# Patient Record
Sex: FEMALE | Race: WHITE | NOT HISPANIC OR LATINO | Employment: OTHER | URBAN - METROPOLITAN AREA
[De-identification: names, ages, dates, MRNs, and addresses within clinical notes are randomized per-mention and may not be internally consistent; named-entity substitution may affect disease eponyms.]

---

## 2017-09-06 ENCOUNTER — TRANSCRIBE ORDERS (OUTPATIENT)
Dept: ADMINISTRATIVE | Facility: HOSPITAL | Age: 62
End: 2017-09-06

## 2017-09-06 DIAGNOSIS — Z12.31 VISIT FOR SCREENING MAMMOGRAM: Primary | ICD-10-CM

## 2017-10-02 ENCOUNTER — HOSPITAL ENCOUNTER (OUTPATIENT)
Dept: RADIOLOGY | Facility: MEDICAL CENTER | Age: 62
Discharge: HOME | End: 2017-10-02
Payer: COMMERCIAL

## 2017-10-02 DIAGNOSIS — Z12.31 VISIT FOR SCREENING MAMMOGRAM: ICD-10-CM

## 2017-10-02 PROCEDURE — G0202 SCR MAMMO BI INCL CAD: HCPCS

## 2018-11-27 ENCOUNTER — TRANSCRIBE ORDERS (OUTPATIENT)
Dept: ADMINISTRATIVE | Facility: HOSPITAL | Age: 63
End: 2018-11-27

## 2018-11-27 DIAGNOSIS — M81.0 OSTEOPOROSIS, UNSPECIFIED OSTEOPOROSIS TYPE, UNSPECIFIED PATHOLOGICAL FRACTURE PRESENCE: Primary | ICD-10-CM

## 2018-11-27 DIAGNOSIS — Z12.31 VISIT FOR SCREENING MAMMOGRAM: Primary | ICD-10-CM

## 2018-12-03 ENCOUNTER — HOSPITAL ENCOUNTER (OUTPATIENT)
Dept: RADIOLOGY | Facility: MEDICAL CENTER | Age: 63
Discharge: HOME | End: 2018-12-03
Payer: COMMERCIAL

## 2018-12-03 VITALS — WEIGHT: 183 LBS | BODY MASS INDEX: 30.49 KG/M2 | HEIGHT: 65 IN

## 2018-12-03 DIAGNOSIS — Z12.31 VISIT FOR SCREENING MAMMOGRAM: ICD-10-CM

## 2018-12-03 DIAGNOSIS — M81.0 OSTEOPOROSIS, UNSPECIFIED OSTEOPOROSIS TYPE, UNSPECIFIED PATHOLOGICAL FRACTURE PRESENCE: ICD-10-CM

## 2018-12-03 PROCEDURE — 77080 DXA BONE DENSITY AXIAL: CPT

## 2018-12-03 PROCEDURE — 77067 SCR MAMMO BI INCL CAD: CPT

## 2019-05-19 ENCOUNTER — HOSPITAL ENCOUNTER (EMERGENCY)
Facility: HOSPITAL | Age: 64
Discharge: HOME | End: 2019-05-19
Attending: EMERGENCY MEDICINE | Admitting: EMERGENCY MEDICINE
Payer: COMMERCIAL

## 2019-05-19 ENCOUNTER — APPOINTMENT (EMERGENCY)
Dept: CT IMAGING | Facility: HOSPITAL | Age: 64
End: 2019-05-19
Payer: COMMERCIAL

## 2019-05-19 VITALS
WEIGHT: 199.08 LBS | BODY MASS INDEX: 31.99 KG/M2 | DIASTOLIC BLOOD PRESSURE: 81 MMHG | SYSTOLIC BLOOD PRESSURE: 181 MMHG | HEIGHT: 66 IN | TEMPERATURE: 97.5 F | RESPIRATION RATE: 17 BRPM | OXYGEN SATURATION: 93 % | HEART RATE: 64 BPM

## 2019-05-19 DIAGNOSIS — R42 VERTIGO: ICD-10-CM

## 2019-05-19 DIAGNOSIS — R42 DIZZINESS: Primary | ICD-10-CM

## 2019-05-19 LAB
ALBUMIN SERPL BCP-MCNC: 4 G/DL (ref 3.5–5)
ALP SERPL-CCNC: 88 U/L (ref 46–116)
ALT SERPL W P-5'-P-CCNC: 64 U/L (ref 12–78)
ANION GAP SERPL CALCULATED.3IONS-SCNC: 12 MMOL/L (ref 4–13)
AST SERPL W P-5'-P-CCNC: 49 U/L (ref 5–45)
ATRIAL RATE: 65 BPM
BASOPHILS # BLD AUTO: 0.05 THOUSANDS/ΜL (ref 0–0.1)
BASOPHILS NFR BLD AUTO: 1 % (ref 0–1)
BILIRUB SERPL-MCNC: 0.6 MG/DL (ref 0.2–1)
BUN SERPL-MCNC: 15 MG/DL (ref 5–25)
CALCIUM SERPL-MCNC: 9.5 MG/DL (ref 8.3–10.1)
CHLORIDE SERPL-SCNC: 103 MMOL/L (ref 100–108)
CO2 SERPL-SCNC: 23 MMOL/L (ref 21–32)
CREAT SERPL-MCNC: 0.93 MG/DL (ref 0.6–1.3)
EOSINOPHIL # BLD AUTO: 0.01 THOUSAND/ΜL (ref 0–0.61)
EOSINOPHIL NFR BLD AUTO: 0 % (ref 0–6)
ERYTHROCYTE [DISTWIDTH] IN BLOOD BY AUTOMATED COUNT: 12.7 % (ref 11.6–15.1)
GFR SERPL CREATININE-BSD FRML MDRD: 65 ML/MIN/1.73SQ M
GLUCOSE SERPL-MCNC: 190 MG/DL (ref 65–140)
HCT VFR BLD AUTO: 47.2 % (ref 34.8–46.1)
HGB BLD-MCNC: 15.7 G/DL (ref 11.5–15.4)
IMM GRANULOCYTES # BLD AUTO: 0.03 THOUSAND/UL (ref 0–0.2)
IMM GRANULOCYTES NFR BLD AUTO: 0 % (ref 0–2)
LYMPHOCYTES # BLD AUTO: 1.37 THOUSANDS/ΜL (ref 0.6–4.47)
LYMPHOCYTES NFR BLD AUTO: 16 % (ref 14–44)
MCH RBC QN AUTO: 31.8 PG (ref 26.8–34.3)
MCHC RBC AUTO-ENTMCNC: 33.3 G/DL (ref 31.4–37.4)
MCV RBC AUTO: 96 FL (ref 82–98)
MONOCYTES # BLD AUTO: 0.24 THOUSAND/ΜL (ref 0.17–1.22)
MONOCYTES NFR BLD AUTO: 3 % (ref 4–12)
NEUTROPHILS # BLD AUTO: 6.63 THOUSANDS/ΜL (ref 1.85–7.62)
NEUTS SEG NFR BLD AUTO: 80 % (ref 43–75)
NRBC BLD AUTO-RTO: 0 /100 WBCS
P AXIS: 31 DEGREES
PLATELET # BLD AUTO: 315 THOUSANDS/UL (ref 149–390)
PMV BLD AUTO: 11.2 FL (ref 8.9–12.7)
POTASSIUM SERPL-SCNC: 4.5 MMOL/L (ref 3.5–5.3)
PR INTERVAL: 176 MS
PROT SERPL-MCNC: 7.8 G/DL (ref 6.4–8.2)
QRS AXIS: 43 DEGREES
QRSD INTERVAL: 84 MS
QT INTERVAL: 412 MS
QTC INTERVAL: 422 MS
RBC # BLD AUTO: 4.93 MILLION/UL (ref 3.81–5.12)
SODIUM SERPL-SCNC: 138 MMOL/L (ref 136–145)
T WAVE AXIS: 21 DEGREES
VENTRICULAR RATE: 63 BPM
WBC # BLD AUTO: 8.33 THOUSAND/UL (ref 4.31–10.16)

## 2019-05-19 PROCEDURE — 80053 COMPREHEN METABOLIC PANEL: CPT | Performed by: EMERGENCY MEDICINE

## 2019-05-19 PROCEDURE — 93005 ELECTROCARDIOGRAM TRACING: CPT

## 2019-05-19 PROCEDURE — 96375 TX/PRO/DX INJ NEW DRUG ADDON: CPT

## 2019-05-19 PROCEDURE — 99284 EMERGENCY DEPT VISIT MOD MDM: CPT | Performed by: EMERGENCY MEDICINE

## 2019-05-19 PROCEDURE — 36415 COLL VENOUS BLD VENIPUNCTURE: CPT | Performed by: EMERGENCY MEDICINE

## 2019-05-19 PROCEDURE — 96361 HYDRATE IV INFUSION ADD-ON: CPT

## 2019-05-19 PROCEDURE — 96374 THER/PROPH/DIAG INJ IV PUSH: CPT

## 2019-05-19 PROCEDURE — 70450 CT HEAD/BRAIN W/O DYE: CPT

## 2019-05-19 PROCEDURE — 99285 EMERGENCY DEPT VISIT HI MDM: CPT

## 2019-05-19 PROCEDURE — 93010 ELECTROCARDIOGRAM REPORT: CPT | Performed by: INTERNAL MEDICINE

## 2019-05-19 PROCEDURE — 85025 COMPLETE CBC W/AUTO DIFF WBC: CPT | Performed by: EMERGENCY MEDICINE

## 2019-05-19 RX ORDER — ONDANSETRON 2 MG/ML
4 INJECTION INTRAMUSCULAR; INTRAVENOUS ONCE
Status: COMPLETED | OUTPATIENT
Start: 2019-05-19 | End: 2019-05-19

## 2019-05-19 RX ORDER — MECLIZINE HCL 12.5 MG/1
25 TABLET ORAL ONCE
Status: COMPLETED | OUTPATIENT
Start: 2019-05-19 | End: 2019-05-19

## 2019-05-19 RX ORDER — ONDANSETRON 4 MG/1
4 TABLET, ORALLY DISINTEGRATING ORAL EVERY 8 HOURS PRN
Qty: 30 TABLET | Refills: 0 | Status: SHIPPED | OUTPATIENT
Start: 2019-05-19 | End: 2021-04-30 | Stop reason: ALTCHOICE

## 2019-05-19 RX ORDER — MECLIZINE HYDROCHLORIDE 25 MG/1
25 TABLET ORAL 3 TIMES DAILY PRN
Qty: 30 TABLET | Refills: 0 | Status: SHIPPED | OUTPATIENT
Start: 2019-05-19 | End: 2021-04-30 | Stop reason: ALTCHOICE

## 2019-05-19 RX ORDER — DIAZEPAM 5 MG/ML
2.5 INJECTION, SOLUTION INTRAMUSCULAR; INTRAVENOUS ONCE
Status: COMPLETED | OUTPATIENT
Start: 2019-05-19 | End: 2019-05-19

## 2019-05-19 RX ADMIN — Medication 2.5 MG: at 10:53

## 2019-05-19 RX ADMIN — MECLIZINE 25 MG: 12.5 TABLET ORAL at 13:00

## 2019-05-19 RX ADMIN — SODIUM CHLORIDE 1000 ML: 0.9 INJECTION, SOLUTION INTRAVENOUS at 10:50

## 2019-05-19 RX ADMIN — ONDANSETRON 4 MG: 2 INJECTION INTRAMUSCULAR; INTRAVENOUS at 10:53

## 2019-05-21 ENCOUNTER — ANNUAL EXAM (OUTPATIENT)
Dept: OBGYN CLINIC | Facility: CLINIC | Age: 64
End: 2019-05-21
Payer: COMMERCIAL

## 2019-05-21 VITALS
SYSTOLIC BLOOD PRESSURE: 140 MMHG | HEIGHT: 65 IN | DIASTOLIC BLOOD PRESSURE: 86 MMHG | BODY MASS INDEX: 32.74 KG/M2 | WEIGHT: 196.5 LBS

## 2019-05-21 DIAGNOSIS — Z12.39 BREAST SCREENING, UNSPECIFIED: ICD-10-CM

## 2019-05-21 DIAGNOSIS — Z01.419 ENCOUNTER FOR WELL WOMAN EXAM: Primary | ICD-10-CM

## 2019-05-21 DIAGNOSIS — Z12.39 BREAST SCREENING: ICD-10-CM

## 2019-05-21 PROCEDURE — S0612 ANNUAL GYNECOLOGICAL EXAMINA: HCPCS | Performed by: PHYSICIAN ASSISTANT

## 2019-05-21 RX ORDER — DIPHENOXYLATE HYDROCHLORIDE AND ATROPINE SULFATE 2.5; .025 MG/1; MG/1
1 TABLET ORAL DAILY
COMMUNITY

## 2019-12-11 ENCOUNTER — HOSPITAL ENCOUNTER (OUTPATIENT)
Dept: RADIOLOGY | Facility: MEDICAL CENTER | Age: 64
Discharge: HOME | End: 2019-12-11
Payer: MEDICARE

## 2019-12-11 VITALS — WEIGHT: 196 LBS | HEIGHT: 65 IN | BODY MASS INDEX: 32.65 KG/M2

## 2019-12-11 DIAGNOSIS — Z12.39 BREAST SCREENING: ICD-10-CM

## 2019-12-11 PROCEDURE — 77067 SCR MAMMO BI INCL CAD: CPT

## 2020-06-11 ENCOUNTER — ANNUAL EXAM (OUTPATIENT)
Dept: OBGYN CLINIC | Facility: CLINIC | Age: 65
End: 2020-06-11
Payer: MEDICARE

## 2020-06-11 VITALS
HEIGHT: 65 IN | WEIGHT: 195 LBS | SYSTOLIC BLOOD PRESSURE: 130 MMHG | BODY MASS INDEX: 32.49 KG/M2 | DIASTOLIC BLOOD PRESSURE: 82 MMHG

## 2020-06-11 DIAGNOSIS — Z78.0 POSTMENOPAUSAL: ICD-10-CM

## 2020-06-11 DIAGNOSIS — N95.2 VAGINAL ATROPHY: ICD-10-CM

## 2020-06-11 DIAGNOSIS — Z12.31 BREAST CANCER SCREENING BY MAMMOGRAM: ICD-10-CM

## 2020-06-11 DIAGNOSIS — B37.2 YEAST DERMATITIS: ICD-10-CM

## 2020-06-11 DIAGNOSIS — Z01.419 ENCOUNTER FOR WELL WOMAN EXAM: Primary | ICD-10-CM

## 2020-06-11 DIAGNOSIS — Z12.39 ENCOUNTER FOR SCREENING BREAST EXAMINATION: ICD-10-CM

## 2020-06-11 PROCEDURE — G0101 CA SCREEN;PELVIC/BREAST EXAM: HCPCS | Performed by: PHYSICIAN ASSISTANT

## 2020-06-11 RX ORDER — CLOTRIMAZOLE AND BETAMETHASONE DIPROPIONATE 10; .64 MG/G; MG/G
CREAM TOPICAL 2 TIMES DAILY
Qty: 30 G | Refills: 0 | Status: SHIPPED | OUTPATIENT
Start: 2020-06-11 | End: 2021-09-17 | Stop reason: ALTCHOICE

## 2020-12-02 ENCOUNTER — TRANSCRIBE ORDERS (OUTPATIENT)
Dept: ADMINISTRATIVE | Facility: HOSPITAL | Age: 65
End: 2020-12-02

## 2020-12-02 DIAGNOSIS — Z12.31 ENCOUNTER FOR SCREENING MAMMOGRAM FOR MALIGNANT NEOPLASM OF BREAST: Primary | ICD-10-CM

## 2021-02-13 DIAGNOSIS — Z23 ENCOUNTER FOR IMMUNIZATION: ICD-10-CM

## 2021-02-17 ENCOUNTER — IMMUNIZATIONS (OUTPATIENT)
Dept: FAMILY MEDICINE CLINIC | Facility: HOSPITAL | Age: 66
End: 2021-02-17

## 2021-02-17 DIAGNOSIS — Z23 ENCOUNTER FOR IMMUNIZATION: Primary | ICD-10-CM

## 2021-02-17 PROCEDURE — 91300 SARS-COV-2 / COVID-19 MRNA VACCINE (PFIZER-BIONTECH) 30 MCG: CPT

## 2021-02-17 PROCEDURE — 0001A SARS-COV-2 / COVID-19 MRNA VACCINE (PFIZER-BIONTECH) 30 MCG: CPT

## 2021-03-08 ENCOUNTER — IMMUNIZATIONS (OUTPATIENT)
Dept: FAMILY MEDICINE CLINIC | Facility: HOSPITAL | Age: 66
End: 2021-03-08

## 2021-03-08 DIAGNOSIS — Z23 ENCOUNTER FOR IMMUNIZATION: Primary | ICD-10-CM

## 2021-03-08 PROCEDURE — 0002A SARS-COV-2 / COVID-19 MRNA VACCINE (PFIZER-BIONTECH) 30 MCG: CPT

## 2021-03-08 PROCEDURE — 91300 SARS-COV-2 / COVID-19 MRNA VACCINE (PFIZER-BIONTECH) 30 MCG: CPT

## 2021-04-07 ENCOUNTER — HOSPITAL ENCOUNTER (OUTPATIENT)
Dept: RADIOLOGY | Facility: MEDICAL CENTER | Age: 66
Discharge: HOME | End: 2021-04-07
Payer: MEDICARE

## 2021-04-07 VITALS — HEIGHT: 65 IN | WEIGHT: 195 LBS | BODY MASS INDEX: 32.49 KG/M2

## 2021-04-07 DIAGNOSIS — Z12.31 ENCOUNTER FOR SCREENING MAMMOGRAM FOR MALIGNANT NEOPLASM OF BREAST: ICD-10-CM

## 2021-04-07 PROCEDURE — 77067 SCR MAMMO BI INCL CAD: CPT

## 2021-04-07 PROCEDURE — 77063 BREAST TOMOSYNTHESIS BI: CPT

## 2021-04-29 NOTE — PROGRESS NOTES
FAMILY MEDICINE NEW PATIENT     Date of Service: 21  Primary Care Provider:   Ellen Galvan MD     Name: Lucía Knutson       : 1955       Age:65 y o  Sex: female      MRN: [id number]      Chief Complaint:New Patient Visit       ASSESSMENT and PLAN:  Lucía Knutson is a 76 y o  female here to establish care with:     Problem List Items Addressed This Visit        Other    Elevated blood pressure reading without diagnosis of hypertension - Primary     BP Readings from Last 3 Encounters:   21 140/80   20 130/82   19 140/86     She has had increasingly elevated blood pressures in recent years, she has noticed this when going to give blood  No symptoms of hypertension  Will have patient follow-up in 6 months for AWV and to recheck blood pressure  Other Visit Diagnoses     Screening for hyperlipidemia        Relevant Orders    Lipid Panel with Direct LDL reflex    Screening for diabetes mellitus        Relevant Orders    Comprehensive metabolic panel    Need for hepatitis C screening test        Relevant Orders    Hepatitis C Antibody ([facility])           SUBJECTIVE:  Sailaja Miller is a 76 y o  female who presents today with a chief complaint of New Patient Visit  HPI     She has not been seeing a doctor recently  She is caring for her mother who is 80 and she realized that she should be seeing doctors  She is healthy, does not have any particular concerns today  She is up to date on routine screenings with mammogram, she had colonoscopy a few years ago  She has noticed her blood pressure creep up over the years when she donates blood  She does walk 3 to 4 miles a day  She works as a seamstress doing alternations for a   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 [place]  She has 4 children and 5 grandchildren  She has been  for 45 years      Review of Systems   Constitutional: Negative for activity change, appetite change, chills, fatigue and fever    HENT: Negative for congestion, rhinorrhea and sore throat  Eyes: Negative for visual disturbance  Respiratory: Negative for cough, chest tightness and shortness of breath  Cardiovascular: Negative for chest pain, palpitations and leg swelling  Gastrointestinal: Negative for constipation, diarrhea, nausea and vomiting  Endocrine: Negative for cold intolerance and heat intolerance  Genitourinary: Negative for dysuria and frequency  Musculoskeletal: Negative for arthralgias, back pain and neck pain  Skin: Negative for rash  Neurological: Negative for dizziness, syncope, light-headedness and headaches  Psychiatric/Behavioral: Negative for dysphoric mood and sleep disturbance  The patient is not nervous/anxious  I have reviewed the patient's PMH, Surgical History, Family History, Social History, Medication List and Allergies        Histories Reviewed and Updated 4/30/2021:  Patient's Medications   New Prescriptions    No medications on file   Previous Medications    CLOTRIMAZOLE-BETAMETHASONE (LOTRISONE) 1-0 05 % CREAM    Apply topically 2 (two) times a day    MULTIVITAMIN (THERAGRAN) TABS    Take 1 tablet by mouth daily   Modified Medications    No medications on file   Discontinued Medications    MECLIZINE (ANTIVERT) 25 MG TABLET    Take 1 tablet (25 mg total) by mouth 3 (three) times a day as needed for dizziness    ONDANSETRON (ZOFRAN-ODT) 4 MG DISINTEGRATING TABLET    Take 1 tablet (4 mg total) by mouth every 8 (eight) hours as needed for nausea or vomiting     No Known Allergies  Past Medical History:   Diagnosis Date    Arthritis     Vertigo 05/2019     Past Surgical History:   Procedure Laterality Date    CHOLECYSTECTOMY      HYSTERECTOMY Bilateral     age 42     Social History     Socioeconomic History    Marital status: /Civil Union     Spouse name: Not on file    Number of children: Not on file    Years of education: Not on file    Highest education level: Not on file   Occupational History    Not on file   Social Needs    Financial resource strain: Not on file    Food insecurity     Worry: Not on file     Inability: Not on file    Transportation needs     Medical: Not on file     Non-medical: Not on file   Tobacco Use    Smoking status: Never Smoker    Smokeless tobacco: Never Used   Substance and Sexual Activity    Alcohol use:  Yes     Alcohol/week: 1 0 standard drinks     Types: 1 Glasses of wine per week     Frequency: Monthly or less     Drinks per session: 1 or 2     Comment: very rarely    Drug use: Never    Sexual activity: Yes     Partners: Male     Birth control/protection: Female Sterilization     Comment: Hysterectomy   Lifestyle    Physical activity     Days per week: Not on file     Minutes per session: Not on file    Stress: Not on file   Relationships    Social connections     Talks on phone: Not on file     Gets together: Not on file     Attends Gnosticism service: Not on file     Active member of club or organization: Not on file     Attends meetings of clubs or organizations: Not on file     Relationship status: Not on file    Intimate partner violence     Fear of current or ex partner: Not on file     Emotionally abused: Not on file     Physically abused: Not on file     Forced sexual activity: Not on file   Other Topics Concern    Not on file   Social History Narrative    Not on file     Family History   Problem Relation Age of Onset    Prostate cancer Father 58    Stroke Father     Hypertension Father     Hyperlipidemia Father     Stomach cancer Maternal Grandfather 61    Stomach cancer Paternal Grandmother [de-identified]    Cancer Paternal Grandfather 48    Colon cancer Maternal Aunt 48    Vision loss Mother     Atrial fibrillation Mother     Glaucoma Mother     Macular degeneration Mother     Dementia Mother     Hypertension Mother     Hyperlipidemia Mother     No Known Problems Sister     No Known Problems Daughter     No Known Problems Maternal Grandmother     No Known Problems Sister     No Known Problems Son     No Known Problems Daughter     No Known Problems Daughter     No Known Problems Paternal Aunt      Immunization History   Administered Date(s) Administered    Influenza, high dose seasonal 0 7 mL 09/15/2020    SARS-CoV-2 / COVID-19 mRNA IM (Pf[employer]) 02/17/2021, 03/08/2021     OBJECTIVE:  /80   Pulse 66   Temp (!) 97 1 °F (36 2 °C)   Resp 16   Ht 5' 5" (1 651 m)   Wt 88 9 kg (196 lb)   SpO2 99%   BMI 32 62 kg/m²   BP Readings from Last 3 Encounters:   04/30/21 140/80   06/11/20 130/82   05/21/19 140/86      Wt Readings from Last 3 Encounters:   04/30/21 88 9 kg (196 lb)   04/07/21 88 5 kg (195 lb)   06/11/20 88 5 kg (195 lb)      Physical Exam  Constitutional:       General: She is not in acute distress  Appearance: Normal appearance  She is normal weight  She is not ill-appearing or diaphoretic  HENT:      Head: Normocephalic and atraumatic  Right Ear: External ear normal       Left Ear: External ear normal       Nose: Nose normal       Mouth/Throat:      Mouth: Mucous membranes are moist    Eyes:      Extraocular Movements: Extraocular movements intact  Conjunctiva/sclera: Conjunctivae normal    Neck:      Musculoskeletal: Normal range of motion and neck supple  Pulmonary:      Effort: Pulmonary effort is normal  No respiratory distress  Abdominal:      General: There is no distension  Musculoskeletal: Normal range of motion  Skin:     Findings: No erythema or rash  Neurological:      General: No focal deficit present  Mental Status: She is alert  Gait: Gait normal    Psychiatric:         Mood and Affect: Mood normal          Behavior: Behavior normal          BMI Counseling: Body mass index is 32 62 kg/m²   The BMI is above normal  Nutrition recommendations include encouraging healthy choices of fruits and vegetables, consuming healthier snacks and reducing intake of saturated and trans fat  Exercise recommendations include moderate physical activity 150 minutes/week and strength training exercises             Rosamaria Almanzar MD     Follow-up in 6 months to recheck BP  Due for PVC 13

## 2021-04-30 ENCOUNTER — OFFICE VISIT (OUTPATIENT)
Dept: FAMILY MEDICINE CLINIC | Facility: CLINIC | Age: 66
End: 2021-04-30
Payer: MEDICARE

## 2021-04-30 VITALS
OXYGEN SATURATION: 99 % | BODY MASS INDEX: 32.65 KG/M2 | TEMPERATURE: 97.1 F | WEIGHT: 196 LBS | RESPIRATION RATE: 16 BRPM | HEART RATE: 66 BPM | HEIGHT: 65 IN | DIASTOLIC BLOOD PRESSURE: 80 MMHG | SYSTOLIC BLOOD PRESSURE: 140 MMHG

## 2021-04-30 DIAGNOSIS — Z11.59 NEED FOR HEPATITIS C SCREENING TEST: ICD-10-CM

## 2021-04-30 DIAGNOSIS — Z13.1 SCREENING FOR DIABETES MELLITUS: ICD-10-CM

## 2021-04-30 DIAGNOSIS — R03.0 ELEVATED BLOOD PRESSURE READING WITHOUT DIAGNOSIS OF HYPERTENSION: Primary | ICD-10-CM

## 2021-04-30 DIAGNOSIS — Z13.220 SCREENING FOR HYPERLIPIDEMIA: ICD-10-CM

## 2021-04-30 PROCEDURE — 99204 OFFICE O/P NEW MOD 45 MIN: CPT | Performed by: FAMILY MEDICINE

## 2021-04-30 NOTE — ASSESSMENT & PLAN NOTE
BP Readings from Last 3 Encounters:   04/30/21 140/80   06/11/20 130/82   05/21/19 140/86     She has had increasingly elevated blood pressures in recent years, she has noticed this when going to give blood  No symptoms of hypertension  Will have patient follow-up in 6 months for AWV and to recheck blood pressure

## 2021-05-03 ENCOUNTER — TELEPHONE (OUTPATIENT)
Dept: FAMILY MEDICINE CLINIC | Facility: CLINIC | Age: 66
End: 2021-05-03

## 2021-05-03 NOTE — TELEPHONE ENCOUNTER
Due for PVC13  TDAP? Was not done at visit on 4/30/2021  Dr Diego Chavarria would like patient to schedule nurse visit  Left message for patient to call back office

## 2021-05-18 ENCOUNTER — APPOINTMENT (OUTPATIENT)
Dept: LAB | Facility: CLINIC | Age: 66
End: 2021-05-18
Payer: MEDICARE

## 2021-05-18 DIAGNOSIS — Z13.220 SCREENING FOR HYPERLIPIDEMIA: ICD-10-CM

## 2021-05-18 DIAGNOSIS — Z11.59 NEED FOR HEPATITIS C SCREENING TEST: ICD-10-CM

## 2021-05-18 DIAGNOSIS — Z13.1 SCREENING FOR DIABETES MELLITUS: ICD-10-CM

## 2021-05-18 LAB
ALBUMIN SERPL BCP-MCNC: 3.9 G/DL (ref 3.5–5)
ALP SERPL-CCNC: 69 U/L (ref 46–116)
ALT SERPL W P-5'-P-CCNC: 30 U/L (ref 12–78)
ANION GAP SERPL CALCULATED.3IONS-SCNC: 7 MMOL/L (ref 4–13)
AST SERPL W P-5'-P-CCNC: 16 U/L (ref 5–45)
BILIRUB SERPL-MCNC: 0.89 MG/DL (ref 0.2–1)
BUN SERPL-MCNC: 13 MG/DL (ref 5–25)
CALCIUM SERPL-MCNC: 9.9 MG/DL (ref 8.3–10.1)
CHLORIDE SERPL-SCNC: 110 MMOL/L (ref 100–108)
CHOLEST SERPL-MCNC: 216 MG/DL (ref 50–200)
CO2 SERPL-SCNC: 26 MMOL/L (ref 21–32)
CREAT SERPL-MCNC: 0.97 MG/DL (ref 0.6–1.3)
GFR SERPL CREATININE-BSD FRML MDRD: 61 ML/MIN/1.73SQ M
GLUCOSE P FAST SERPL-MCNC: 109 MG/DL (ref 65–99)
HCV AB SER QL: NORMAL
HDLC SERPL-MCNC: 49 MG/DL
LDLC SERPL CALC-MCNC: 141 MG/DL (ref 0–100)
POTASSIUM SERPL-SCNC: 4.2 MMOL/L (ref 3.5–5.3)
PROT SERPL-MCNC: 7.4 G/DL (ref 6.4–8.2)
SODIUM SERPL-SCNC: 143 MMOL/L (ref 136–145)
TRIGL SERPL-MCNC: 132 MG/DL

## 2021-05-18 PROCEDURE — 80061 LIPID PANEL: CPT

## 2021-05-18 PROCEDURE — 86803 HEPATITIS C AB TEST: CPT

## 2021-05-18 PROCEDURE — 80053 COMPREHEN METABOLIC PANEL: CPT

## 2021-05-18 PROCEDURE — 36415 COLL VENOUS BLD VENIPUNCTURE: CPT

## 2021-06-14 ENCOUNTER — OFFICE VISIT (OUTPATIENT)
Dept: OBGYN CLINIC | Facility: CLINIC | Age: 66
End: 2021-06-14
Payer: MEDICARE

## 2021-06-14 VITALS
DIASTOLIC BLOOD PRESSURE: 80 MMHG | SYSTOLIC BLOOD PRESSURE: 134 MMHG | BODY MASS INDEX: 32.15 KG/M2 | WEIGHT: 193 LBS | HEIGHT: 65 IN

## 2021-06-14 DIAGNOSIS — N95.2 VAGINAL ATROPHY: ICD-10-CM

## 2021-06-14 DIAGNOSIS — Z12.11 ENCOUNTER FOR SCREENING COLONOSCOPY: ICD-10-CM

## 2021-06-14 DIAGNOSIS — Z12.31 ENCOUNTER FOR SCREENING MAMMOGRAM FOR BREAST CANCER: ICD-10-CM

## 2021-06-14 DIAGNOSIS — Z78.0 POSTMENOPAUSAL: Primary | ICD-10-CM

## 2021-06-14 PROCEDURE — 99213 OFFICE O/P EST LOW 20 MIN: CPT | Performed by: PHYSICIAN ASSISTANT

## 2021-06-14 NOTE — PROGRESS NOTES
Assessment/Plan:    No problem-specific Assessment & Plan notes found for this encounter  Diagnoses and all orders for this visit:    Postmenopausal    Vaginal atrophy    Encounter for screening mammogram for breast cancer  -     Mammo screening bilateral w 3d & cad; Future    Encounter for screening colonoscopy  -     Ambulatory referral for colon cancer education; Future          Subjective:      Patient ID: Sanju Montemayor is a 76 y o  female  Pt presents for follow up atrophy today--  She has no complaints  She has no bleeding or pelvic pain--hyster  Bowel and bladder are regular  Colonoscopy--never  No breast concerns today  Last mammo--due    No pap today  rx mammo  rx colonsocopy      The following portions of the patient's history were reviewed and updated as appropriate: allergies, current medications, past family history, past medical history, past social history, past surgical history and problem list     Review of Systems   Constitutional: Negative for chills, fever and unexpected weight change  Gastrointestinal: Negative for abdominal pain, blood in stool, constipation and diarrhea  Genitourinary: Negative  Objective:      /80   Ht 5' 5" (1 651 m)   Wt 87 5 kg (193 lb)   BMI 32 12 kg/m²          Physical Exam  Vitals and nursing note reviewed  Constitutional:       Appearance: She is well-developed  HENT:      Head: Normocephalic and atraumatic  Chest:      Breasts:         Right: No inverted nipple, mass, nipple discharge or skin change  Left: No inverted nipple, mass, nipple discharge or skin change  Abdominal:      Palpations: Abdomen is soft  Genitourinary:     Exam position: Supine  Labia:         Right: No rash, tenderness or lesion  Left: No rash, tenderness or lesion  Vagina: Normal       Cervix: No cervical motion tenderness, discharge or friability  Uterus: Absent         Adnexa:         Right: No mass, tenderness or fullness  Left: No mass, tenderness or fullness  Comments: Atrophy noted  Musculoskeletal:      Cervical back: Normal range of motion  Lymphadenopathy:      Lower Body: No right inguinal adenopathy  No left inguinal adenopathy

## 2021-06-14 NOTE — PROGRESS NOTES
Patient is here for yearly exam      Patient is not due for a pap smear due to having a hysterectomy in 1996  HYSTER (1996)     12/11/19 Normal Mammo

## 2021-09-17 ENCOUNTER — OFFICE VISIT (OUTPATIENT)
Dept: FAMILY MEDICINE CLINIC | Facility: CLINIC | Age: 66
End: 2021-09-17
Payer: MEDICARE

## 2021-09-17 VITALS
HEART RATE: 80 BPM | WEIGHT: 197 LBS | RESPIRATION RATE: 16 BRPM | SYSTOLIC BLOOD PRESSURE: 124 MMHG | TEMPERATURE: 97.5 F | BODY MASS INDEX: 32.82 KG/M2 | DIASTOLIC BLOOD PRESSURE: 72 MMHG | HEIGHT: 65 IN

## 2021-09-17 DIAGNOSIS — M75.02 ADHESIVE CAPSULITIS OF LEFT SHOULDER: Primary | ICD-10-CM

## 2021-09-17 PROCEDURE — 99213 OFFICE O/P EST LOW 20 MIN: CPT | Performed by: FAMILY MEDICINE

## 2021-09-17 NOTE — PATIENT INSTRUCTIONS
Adhesive Capsulitis   AMBULATORY CARE:   Adhesive capsulitis  happens when tissues in your shoulder tighten and swell  The condition is often called frozen shoulder because the swollen tissues cause pain and decrease your shoulder movement  Common signs and symptoms:   · Shoulder pain and stiffness that gets worse over time    · Pain at night that wakes you    · An ache in your shoulder even at rest but that gets worse with movement    · Muscle spasms in your neck, shoulder joint, or near your collarbone    · Trouble reaching over your head or behind you    · Muscle weakness    Seek care immediately if:   · You have new or increased trouble moving your arm  Contact your healthcare provider if:   · You have worse pain and stiffness in your shoulder  · You have questions or concerns about your condition  Treatment:  The goal of treatment is to help you regain as much shoulder movement as possible  Treatment will depend on what stage you are in  Ask your healthcare provider about these and other treatments for adhesive capsulitis:  · Prescription pain medicine  may be given  Ask your healthcare provider how to take this medicine safely  Some prescription pain medicines contain acetaminophen  Do not take other medicines that contain acetaminophen without talking to your healthcare provider  Too much acetaminophen may cause liver damage  Prescription pain medicine may cause constipation  Ask your healthcare provider how to prevent or treat constipation  · NSAIDs  help decrease swelling and pain or fever  This medicine is available with or without a doctor's order  NSAIDs can cause stomach bleeding or kidney problems in certain people  If you take blood thinner medicine, always ask your healthcare provider if NSAIDs are safe for you  Always read the medicine label and follow directions  · Steroid medicine  helps decrease pain and swelling   Healthcare providers may give this medicine as a shot into your shoulder  · Physical therapy:  A physical therapist teaches you exercises to help improve movement and strength, and to decrease pain  · Manipulation  is a procedure used to move your shoulder  You will be given anesthesia to make you sleep through this procedure  Manipulation releases tightness in your shoulder and improves movement  · Surgery  may be used to cut the tissues in your shoulder to release the tightness  During surgery, swollen or damaged tissue may also be removed  Surgery may be needed if other treatments do not help  Manage adhesive capsulitis:   · Stretches:      ? Doorway stretch:   a doorway with your painful arm bent at the elbow  Place your hand on the door frame and turn your body away from the door frame  Hold this position for 30 seconds  Relax and repeat  ? Forward stretch:  Lie on your back with your legs straight out  Use your healthy arm to push your painful arm up over your head until you feel a gentle stretch  Hold this position for 15 seconds  Slowly lower your arm to the starting position  Relax and repeat  ? Crossover stretch:  Use your healthy arm to gently pull your painful arm across your chest just below your chin  Pull until you feel a gentle stretch  Hold this position for 30 seconds  Relax and repeat  · Apply ice as directed  Ice helps decrease pain and swelling  Apply ice to help ease pain after stretching  Use an ice pack, or put crushed ice in a plastic bag  Cover it with a towel before you apply it to your shoulder  Apply ice for 15 to 20 minutes every hour, or as directed  · Apply heat as directed  Heat helps relax muscles and may help improve shoulder movement  Use a heat pack, or soak a small towel in warm water  Wring out the extra water before you apply the towel to your shoulder  Apply heat for 20 to 30 minutes every hour, or as directed      Follow up with your healthcare provider as directed:  Write down your questions so you remember to ask them during your visits  © Copyright [employer] 2021 Information is for End User's use only and may not be sold, redistributed or otherwise used for commercial purposes  All illustrations and images included in CareNotes® are the copyrighted property of A D A M , Inc  or Dario Barakat  The above information is an  only  It is not intended as medical advice for individual conditions or treatments  Talk to your doctor, nurse or pharmacist before following any medical regimen to see if it is safe and effective for you

## 2021-09-17 NOTE — PROGRESS NOTES
FAMILY MEDICINE PROGRESS NOTE    Date of Service: 21  Primary Care Provider:   Brendon Gdooy MD       Name: Debi Hamilton       : 1955       Age:66 y o  Sex: female      MRN: [id number]      Chief Complaint:Shoulder Pain (left)       ASSESSMENT and PLAN:  Debi Hamilton is a 78 y o  female with:     Problem List Items Addressed This Visit     None      Visit Diagnoses     Adhesive capsulitis of left shoulder    -  Primary    Relevant Orders    Ambulatory referral to Physical Therapy        Patient with signs and symptoms of adhesive capsulitis  Will refer to PT for eval and treatment  Counseled on importance of therapy to restore range of motion and function  SUBJECTIVE:  Debi Hamilton is a 78 y o  female who presents today with a chief complaint of Shoulder Pain (left)  HPI     She reports shoulder pain and decreased range of motion  She had has some pain and decreased motion for a period of time that worsened Tuesday 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  She has difficulty pulling shirt over her head, washing hair, putting on seatbelt  It is impacting her ADLs, interrupting sleep  She has been taking ibuprofen which helps some  Review of Systems   Musculoskeletal: Positive for arthralgias  Negative for neck pain  Neurological: Positive for weakness (in left shoulder )  Negative for numbness  I have reviewed the patient's Past Medical History      Current Outpatient Medications:     multivitamin (THERAGRAN) TABS, Take 1 tablet by mouth daily, Disp: , Rfl:     OBJECTIVE:  /72   Pulse 80   Temp 97 5 °F (36 4 °C)   Resp 16   Ht 5' 5" (1 651 m)   Wt 89 4 kg (197 lb)   BMI 32 78 kg/m²    BP Readings from Last 3 Encounters:   21 124/72   21 134/80   21 140/80      Wt Readings from Last 3 Encounters:   21 89 4 kg (197 lb)   21 87 5 kg (193 lb)   21 88 9 kg (196 lb)      Physical Exam  Constitutional:       General: She is not in acute distress  Appearance: Normal appearance  She is not toxic-appearing  Musculoskeletal:      Right shoulder: Normal  No swelling or deformity  Normal range of motion  Normal strength  Left shoulder: Tenderness present  No bony tenderness  Decreased range of motion  Decreased strength (secondary to decreased range of motion)  Cervical back: Normal range of motion  No rigidity  Comments: Unable to forward flex, abduct past 90 degrees  Unable to perform Hawking's an Neer's due to limited range of motion  Negative Yergason's test   Neurological:      Mental Status: She is alert  Return if symptoms worsen or fail to improve, for due for ARSALAN Betancur MD    Note: Portions of the record have been created with voice recognition software  Occasional wrong word or "sound a like" substitutions may have occurred due to the inherent limitations of voice recognition software  Read the chart carefully and recognize, using context, where substitutions have occurred

## 2021-10-25 ENCOUNTER — RA CDI HCC (OUTPATIENT)
Dept: OTHER | Facility: HOSPITAL | Age: 66
End: 2021-10-25

## 2021-10-31 PROBLEM — E66.09 CLASS 1 OBESITY DUE TO EXCESS CALORIES WITHOUT SERIOUS COMORBIDITY WITH BODY MASS INDEX (BMI) OF 32.0 TO 32.9 IN ADULT: Status: ACTIVE | Noted: 2021-10-31

## 2021-10-31 PROBLEM — E78.5 DYSLIPIDEMIA: Status: ACTIVE | Noted: 2021-10-31

## 2021-10-31 PROBLEM — R73.01 IMPAIRED FASTING GLUCOSE: Status: ACTIVE | Noted: 2021-10-31

## 2021-10-31 PROBLEM — E66.811 CLASS 1 OBESITY DUE TO EXCESS CALORIES WITHOUT SERIOUS COMORBIDITY WITH BODY MASS INDEX (BMI) OF 32.0 TO 32.9 IN ADULT: Status: ACTIVE | Noted: 2021-10-31

## 2021-11-01 ENCOUNTER — OFFICE VISIT (OUTPATIENT)
Dept: FAMILY MEDICINE CLINIC | Facility: CLINIC | Age: 66
End: 2021-11-01

## 2021-11-01 VITALS
HEART RATE: 62 BPM | WEIGHT: 199 LBS | DIASTOLIC BLOOD PRESSURE: 78 MMHG | RESPIRATION RATE: 18 BRPM | OXYGEN SATURATION: 97 % | SYSTOLIC BLOOD PRESSURE: 134 MMHG | BODY MASS INDEX: 33.15 KG/M2 | TEMPERATURE: 96.8 F | HEIGHT: 65 IN

## 2021-11-01 DIAGNOSIS — R03.0 ELEVATED BLOOD PRESSURE READING WITHOUT DIAGNOSIS OF HYPERTENSION: ICD-10-CM

## 2021-11-01 DIAGNOSIS — E66.09 CLASS 1 OBESITY DUE TO EXCESS CALORIES WITHOUT SERIOUS COMORBIDITY WITH BODY MASS INDEX (BMI) OF 33.0 TO 33.9 IN ADULT: ICD-10-CM

## 2021-11-01 DIAGNOSIS — E78.5 DYSLIPIDEMIA: ICD-10-CM

## 2021-11-01 DIAGNOSIS — Z12.12 SCREENING FOR COLORECTAL CANCER: ICD-10-CM

## 2021-11-01 DIAGNOSIS — Z23 ENCOUNTER FOR IMMUNIZATION: ICD-10-CM

## 2021-11-01 DIAGNOSIS — R73.01 IMPAIRED FASTING GLUCOSE: ICD-10-CM

## 2021-11-01 DIAGNOSIS — Z00.00 MEDICARE ANNUAL WELLNESS VISIT, INITIAL: Primary | ICD-10-CM

## 2021-11-01 DIAGNOSIS — Z12.11 SCREENING FOR COLORECTAL CANCER: ICD-10-CM

## 2021-11-01 PROCEDURE — G0008 ADMIN INFLUENZA VIRUS VAC: HCPCS

## 2021-11-01 PROCEDURE — 1123F ACP DISCUSS/DSCN MKR DOCD: CPT

## 2021-11-01 PROCEDURE — 90662 IIV NO PRSV INCREASED AG IM: CPT

## 2021-11-01 PROCEDURE — G0438 PPPS, INITIAL VISIT: HCPCS | Performed by: FAMILY MEDICINE

## 2022-02-01 ENCOUNTER — TELEPHONE (OUTPATIENT)
Dept: GASTROENTEROLOGY | Facility: CLINIC | Age: 67
End: 2022-02-01

## 2022-02-01 ENCOUNTER — PREP FOR PROCEDURE (OUTPATIENT)
Dept: GASTROENTEROLOGY | Facility: CLINIC | Age: 67
End: 2022-02-01

## 2022-02-01 DIAGNOSIS — Z12.11 COLON CANCER SCREENING: Primary | ICD-10-CM

## 2022-02-01 NOTE — TELEPHONE ENCOUNTER
Scheduled date of colonoscopy (as of today): 5/4/2022  Physician performing colonoscopy:Dr Ma  Location of colonoscopy:[facility]  Bowel prep reviewed with patient: Miralax/Mag Citrate  Instructions reviewed with patient by: Nora/kaitlyn  Clearances: N/A        02/01/22  Screened by: Trista Menezes MA     Referring Provider Dr Sewell     Pre- Screening:      Body mass index is 33 63 kg/m²  Has patient been referred for a routine screening Colonoscopy? yes  Is the patient between 39-70 years old? yes        Previous Colonoscopy yes   If yes:    Date: about 10 years ago    Facility    Reason:         SCHEDULING STAFF: If the patient is between 45yrs-49yrs, please advise patient to confirm benefits/coverage with their insurance company for a routine screening colonoscopy, some insurance carriers will only cover at [facility] or [facility]  If the patient is over 66years old, please schedule an office visit       Does the patient want to see a Gastroenterologist prior to their procedure OR are they having any GI symptoms? no     Has the patient been hospitalized or had abdominal surgery in the past 6 months? no     Does the patient use supplemental oxygen? no     Does the patient take Coumadin, Lovenox, Plavix, Elliquis, Xarelto, or other blood thinning medication? no     Has the patient had a stroke, cardiac event, or stent placed in the past year? no     SCHEDULING STAFF: If patient answers NO to above questions, then schedule procedure   If patient answers YES to above questions, then schedule office appointment       If patient is between 45yrs - 49yrs, please advise patient that we will have to confirm benefits & coverage with their insurance company for a routine screening colonoscopy

## 2022-04-14 ENCOUNTER — HOSPITAL ENCOUNTER (OUTPATIENT)
Dept: RADIOLOGY | Facility: MEDICAL CENTER | Age: 67
Discharge: HOME | End: 2022-04-14
Payer: MEDICARE

## 2022-04-14 VITALS — WEIGHT: 199 LBS | HEIGHT: 65 IN | BODY MASS INDEX: 33.15 KG/M2

## 2022-04-14 DIAGNOSIS — Z12.31 ENCOUNTER FOR SCREENING MAMMOGRAM FOR BREAST CANCER: ICD-10-CM

## 2022-04-14 PROCEDURE — 77067 SCR MAMMO BI INCL CAD: CPT

## 2022-04-14 PROCEDURE — 77063 BREAST TOMOSYNTHESIS BI: CPT

## 2022-04-28 NOTE — TELEPHONE ENCOUNTER
Pt called in regarding prep for colonoscopy  She's not sure if she received any paperwork on it  She is requesting a call back

## 2022-05-04 ENCOUNTER — ANESTHESIA (OUTPATIENT)
Dept: GASTROENTEROLOGY | Facility: AMBULARY SURGERY CENTER | Age: 67
End: 2022-05-04

## 2022-05-04 ENCOUNTER — ANESTHESIA EVENT (OUTPATIENT)
Dept: GASTROENTEROLOGY | Facility: AMBULARY SURGERY CENTER | Age: 67
End: 2022-05-04

## 2022-05-04 ENCOUNTER — HOSPITAL ENCOUNTER (OUTPATIENT)
Dept: GASTROENTEROLOGY | Facility: AMBULARY SURGERY CENTER | Age: 67
Setting detail: OUTPATIENT SURGERY
Discharge: HOME | End: 2022-05-04
Attending: INTERNAL MEDICINE | Admitting: INTERNAL MEDICINE
Payer: MEDICARE

## 2022-05-04 VITALS
OXYGEN SATURATION: 96 % | BODY MASS INDEX: 32.95 KG/M2 | WEIGHT: 193 LBS | TEMPERATURE: 96.8 F | HEART RATE: 63 BPM | RESPIRATION RATE: 20 BRPM | SYSTOLIC BLOOD PRESSURE: 128 MMHG | HEIGHT: 64 IN | DIASTOLIC BLOOD PRESSURE: 70 MMHG

## 2022-05-04 DIAGNOSIS — Z12.11 COLON CANCER SCREENING: ICD-10-CM

## 2022-05-04 PROCEDURE — 45385 COLONOSCOPY W/LESION REMOVAL: CPT | Performed by: INTERNAL MEDICINE

## 2022-05-04 PROCEDURE — 88305 TISSUE EXAM BY PATHOLOGIST: CPT | Performed by: PATHOLOGY

## 2022-05-04 RX ORDER — PROPOFOL 10 MG/ML
INJECTION, EMULSION INTRAVENOUS AS NEEDED
Status: DISCONTINUED | OUTPATIENT
Start: 2022-05-04 | End: 2022-05-04

## 2022-05-04 RX ORDER — LIDOCAINE HYDROCHLORIDE 10 MG/ML
INJECTION, SOLUTION EPIDURAL; INFILTRATION; INTRACAUDAL; PERINEURAL AS NEEDED
Status: DISCONTINUED | OUTPATIENT
Start: 2022-05-04 | End: 2022-05-04

## 2022-05-04 RX ORDER — SODIUM CHLORIDE, SODIUM LACTATE, POTASSIUM CHLORIDE, CALCIUM CHLORIDE 600; 310; 30; 20 MG/100ML; MG/100ML; MG/100ML; MG/100ML
INJECTION, SOLUTION INTRAVENOUS CONTINUOUS PRN
Status: DISCONTINUED | OUTPATIENT
Start: 2022-05-04 | End: 2022-05-04

## 2022-05-04 RX ADMIN — PROPOFOL 20 MG: 10 INJECTION, EMULSION INTRAVENOUS at 10:16

## 2022-05-04 RX ADMIN — PROPOFOL 100 MG: 10 INJECTION, EMULSION INTRAVENOUS at 10:15

## 2022-05-04 RX ADMIN — PROPOFOL 30 MG: 10 INJECTION, EMULSION INTRAVENOUS at 10:24

## 2022-05-04 RX ADMIN — SODIUM CHLORIDE, SODIUM LACTATE, POTASSIUM CHLORIDE, AND CALCIUM CHLORIDE: .6; .31; .03; .02 INJECTION, SOLUTION INTRAVENOUS at 10:05

## 2022-05-04 RX ADMIN — PROPOFOL 30 MG: 10 INJECTION, EMULSION INTRAVENOUS at 10:34

## 2022-05-04 RX ADMIN — PROPOFOL 50 MG: 10 INJECTION, EMULSION INTRAVENOUS at 10:20

## 2022-05-04 RX ADMIN — PROPOFOL 50 MG: 10 INJECTION, EMULSION INTRAVENOUS at 10:30

## 2022-05-04 RX ADMIN — LIDOCAINE HYDROCHLORIDE 50 MG: 10 INJECTION, SOLUTION EPIDURAL; INFILTRATION; INTRACAUDAL at 10:15

## 2022-05-04 NOTE — ANESTHESIA PREPROCEDURE EVALUATION
Procedure:  COLONOSCOPY    Relevant Problems   Other   (+) Class 1 obesity due to excess calories without serious comorbidity with body mass index (BMI) of 33 0 to 33 9 in adult   (+) Dyslipidemia   (+) Elevated blood pressure reading without diagnosis of hypertension   (+) Impaired fasting glucose      Adequately NPO  No prior anesthesia complications  Good functional capacity  Physical Exam    Airway    Mallampati score: III  TM Distance: >3 FB  Neck ROM: full     Dental   No notable dental hx     Cardiovascular  Rhythm: regular, Rate: normal,     Pulmonary  Pulmonary exam normal     Other Findings        Anesthesia Plan  ASA Score- 2     Anesthesia Type- IV sedation with anesthesia with ASA Monitors  Additional Monitors:   Airway Plan:     Comment: Spontaneous with supplemental O2  Plan Factors-Exercise tolerance (METS): >4 METS  Chart reviewed  EKG reviewed  Existing labs reviewed  Patient summary reviewed  Patient is not a current smoker  Obstructive sleep apnea risk education given perioperatively  Induction- intravenous  Postoperative Plan-     Informed Consent- Anesthetic plan and risks discussed with patient  I personally reviewed this patient with the CRNA  Discussed and agreed on the Anesthesia Plan with the CRNA  James Gómez

## 2022-05-04 NOTE — H&P
History and Physical -  [facility]  Sailaja Anirudh King 78 y o  female MRN: [id number]                  HPI: Jermaine Garcia is a 78y o  year old female who presents for colonoscopy for colon cancer screening  REVIEW OF SYSTEMS: Per the HPI, and otherwise unremarkable  Historical Information   Past Medical History:   Diagnosis Date    Arthritis     Vertigo 05/2019     Past Surgical History:   Procedure Laterality Date    CHOLECYSTECTOMY      HYSTERECTOMY Bilateral     age 42     Social History   Social History     Substance and Sexual Activity   Alcohol Use Yes    Alcohol/week: 1 0 standard drink    Types: 1 Glasses of wine per week    Comment: very rarely     Social History     Substance and Sexual Activity   Drug Use Never     Social History     Tobacco Use   Smoking Status Never Smoker   Smokeless Tobacco Never Used     Family History   Problem Relation Age of Onset    Prostate cancer Father 58    Stroke Father     Hypertension Father     Hyperlipidemia Father     Stomach cancer Maternal Grandfather 61    Stomach cancer Paternal Grandmother [de-identified]    Cancer Paternal Grandfather 48    Colon cancer Maternal Aunt 48    Vision loss Mother     Atrial fibrillation Mother     Glaucoma Mother     Macular degeneration Mother    Normie Glory Dementia Mother     Hypertension Mother     Hyperlipidemia Mother     No Known Problems Sister     No Known Problems Daughter     No Known Problems Maternal Grandmother     No Known Problems Sister     No Known Problems Son     No Known Problems Daughter     No Known Problems Daughter     No Known Problems Paternal Aunt        Meds/Allergies       Current Outpatient Medications:     multivitamin (THERAGRAN) TABS    No Known Allergies    Objective     There were no vitals taken for this visit        PHYSICAL EXAM    Gen: NAD  Head: NCAT  CV: RRR  CHEST: Clear  ABD: soft, NT/ND  EXT: no edema      ASSESSMENT/PLAN:  This is a 78y o  year old female here for colonoscopy, and she is stable and optimized for her procedure

## 2022-05-04 NOTE — DISCHARGE INSTRUCTIONS
Colorectal Polyps   WHAT YOU NEED TO KNOW:   Colorectal polyps are small growths of tissue in the lining of the colon and rectum  Most polyps are hyperplastic polyps and are usually benign (noncancerous)  Certain types of polyps, called adenomatous polyps, may turn into cancer  DISCHARGE INSTRUCTIONS:   Follow up with your healthcare provider or gastroenterologist as directed: You may need to return for more tests, such as another colonoscopy  Write down your questions so you remember to ask them during your visits  Reduce your risk for colorectal polyps:   · Eat a variety of healthy foods:  Healthy foods include fruit, vegetables, whole-grain breads, low-fat dairy products, beans, lean meat, and fish  Ask if you need to be on a special diet  · Maintain a healthy weight:  Ask your healthcare provider if you need to lose weight and how much you need to lose  Ask for help with a weight loss program     · Exercise:  Begin to exercise slowly and do more as you get stronger  Talk with your healthcare provider before you start an exercise program      · Limit alcohol:  Your risk for polyps increases the more you drink  · Do not smoke: If you smoke, it is never too late to quit  Ask for information about how to stop  For support and more information:   · Chris Marshall ([facility])  [address] [address]  Phone: [phone]  Web Address: www digestive  niddk nih gov    Contact your healthcare provider or gastroenterologist if:   · You have a fever  · You have chills, a cough, or feel weak and achy  · You have abdominal pain that does not go away or gets worse after you take medicine  · Your abdomen is swollen  · You are losing weight without trying  · You have questions or concerns about your condition or care  Seek care immediately or call 911 if:   · You have sudden shortness of breath       · You have a fast heart rate, fast breathing, or are too dizzy to stand up  · You have severe abdominal pain  · You see blood in your bowel movement  © Copyright [address] Information is for End User's use only and may not be sold, redistributed or otherwise used for commercial purposes  All illustrations and images included in CareNotes® are the copyrighted property of A D A M , Inc  or [address] [place], [place] [address]am   The above information is an  only  It is not intended as medical advice for individual conditions or treatments  Talk to your doctor, nurse or pharmacist before following any medical regimen to see if it is safe and effective for you  Colonoscopy   WHAT YOU NEED TO KNOW:   A colonoscopy is a procedure to examine the inside of your colon (intestine) with a scope  Polyps or tissue growths may have been removed during your colonoscopy  It is normal to feel bloated and to have some abdominal discomfort  You should be passing gas  If you have hemorrhoids or you had polyps removed, you may have a small amount of bleeding  DISCHARGE INSTRUCTIONS:   Seek care immediately if:   · You have a large amount of bright red blood in your bowel movements  · Your abdomen is hard and firm and you have severe pain  · You have sudden trouble breathing  Contact your healthcare provider if:   · You develop a rash or hives  · You have a fever within 24 hours of your procedure       · You have not had a bowel movement for 3 days after your procedure  · You have questions or concerns about your condition or care  Activity:   · Do not lift, strain, or run  for 3 days after your procedure  · Rest after your procedure  You have been given medicine to relax you  Do not  drive or make important decisions until the day after your procedure  Return to your normal activity as directed  · Relieve gas and discomfort from bloating  by lying on your right side with a heating pad on your abdomen   You may need to take short walks to help the gas move out  Eat small meals until bloating is relieved  If you had polyps removed: For 7 days after your procedure:  · Do not  take aspirin  · Do not  go on long car rides  Follow up with your healthcare provider as directed:  Write down your questions so you remember to ask them during your visits  © 2017 [address] is for End User's use only and may not be sold, redistributed or otherwise used for commercial purposes  All illustrations and images included in CareNotes® are the copyrighted property of A D A M , Inc  or Efra Méndez  The above information is an  only  It is not intended as medical advice for individual conditions or treatments  Talk to your doctor, nurse or pharmacist before following any medical regimen to see if it is safe and effective for you  Diverticulosis   WHAT YOU NEED TO KNOW:   Diverticulosis is a condition that causes small pockets called diverticula to form in your intestine  These pockets make it difficult for bowel movements to pass through your digestive system  DISCHARGE INSTRUCTIONS:   Seek care immediately if:   · You have severe pain on the left side of your lower abdomen  · Your bowel movements are bright or dark red  Call your doctor if:   · You have a fever and chills  · You feel dizzy or lightheaded  · You have nausea, or you are vomiting  · You have a change in your bowel movements  · You have questions or concerns about your condition or care  Medicines:   · Medicines  to soften your bowel movements may be given  You may also need medicines to treat symptoms such as bloating and pain  · Take your medicine as directed  Contact your healthcare provider if you think your medicine is not helping or if you have side effects  Tell him or her if you are allergic to any medicine  Keep a list of the medicines, vitamins, and herbs you take  Include the amounts, and when and why you take them  Bring the list or the pill bottles to follow-up visits  Carry your medicine list with you in case of an emergency  Self-care: The goal of treatment is to manage any symptoms you have and prevent other problems such as diverticulitis  Diverticulitis is swelling or infection of the diverticula  Your healthcare provider may recommend any of the following:  · Eat a variety of high-fiber foods  High-fiber foods help you have regular bowel movements  High-fiber foods include cooked beans, fruits, vegetables, and some cereals  Most adults need 25 to 35 grams of fiber each day  Your healthcare provider may recommend that you have more  Ask your healthcare provider how much fiber you need  Increase fiber slowly  You may have abdominal discomfort, bloating, and gas if you add fiber to your diet too quickly  You may need to take a fiber supplement if you are not getting enough fiber from food  · Drink liquids as directed  You may need to drink 2 to 3 liters (8 to 12 cups) of liquids every day  Ask your healthcare provider how much liquid to drink each day and which liquids are best for you  · Apply heat  on your abdomen for 20 to 30 minutes every 2 hours for as many days as directed  Heat helps decrease pain and muscle spasms  Help prevent diverticulitis or other symptoms: The following may help decrease your risk for diverticulitis or symptoms, such as bleeding  Talk to your provider about these or other things you can do to prevent problems that may occur with diverticulosis  · Exercise regularly  Ask your healthcare provider about the best exercise plan for you  Exercise can help you have regular bowel movements  Get 30 minutes of exercise on most days of the week  · Maintain a healthy weight  Ask your healthcare provider what a healthy weight is for you  Ask him or her to help you create a weight loss plan if you are overweight      · Do not smoke  Nicotine and other chemicals in cigarettes increase your risk for diverticulitis  Ask your healthcare provider for information if you currently smoke and need help to quit  E-cigarettes or smokeless tobacco still contain nicotine  Talk to your healthcare provider before you use these products  · Ask your healthcare provider if it is safe to take NSAIDs  NSAIDs may increase your risk of diverticulitis  Follow up with your doctor as directed:  Write down your questions so you remember to ask them during your visits  © Copyright [employer] 2022 Information is for End User's use only and may not be sold, redistributed or otherwise used for commercial purposes  All illustrations and images included in CareNotes® are the copyrighted property of A D A M , Inc  or [address] [place] [address]  The above information is an  only  It is not intended as medical advice for individual conditions or treatments  Talk to your doctor, nurse or pharmacist before following any medical regimen to see if it is safe and effective for you  Diverticulosis Diet   WHAT YOU NEED TO KNOW:   What is a diverticulosis diet? A diverticulosis diet includes high-fiber foods  High-fiber foods help you have regular bowel movements  Extra fiber may decrease your risk of forming new diverticula (small pockets) in your intestine  A high-fiber diet may also help prevent diverticulitis  Diverticulitis is a painful condition that occurs when diverticula become inflamed or infected  You do not need to avoid nuts, seeds, corn, or popcorn while you are on a diverticulosis diet  How much fiber do I need? You may need 25 to 35 grams of fiber each day  Ask your dietitian or healthcare provider how much fiber you should have  Increase your intake of fiber slowly  When you eat more fiber, you may have gas and feel bloated  You may need to take a fiber supplement if you do not get enough fiber from food   Drink plenty of liquids as you increase the fiber in your diet  Your dietitian or healthcare provider may recommend 8 eight-ounce cups or more each day  Ask which liquids are best for you  Which foods are high in fiber? · Foods with at least 4 grams of fiber per serving:      ? ? to ½ cup of high-fiber cereal (check the nutrition label on the box)    ? ½ cup of blackberries or raspberries    ? 4 dried prunes    ? 1 cooked artichoke    ? ½ cup of cooked legumes, such as lentils, or red, kidney, and nguyen beans    · Foods with 1 to 3 grams of fiber per serving:      ? 1 slice of whole-wheat, pumpernickel, or rye bread    ? 4 whole-wheat crackers    ? ½ cup of cereal with 1 to 3 grams of fiber per serving (check the nutrition label on the box)    ? 1 piece of fruit, such as an apple, banana, pear, kiwi, or orange    ? 3 dates    ? ½ cup of canned apricots, fruit cocktail, peaches, or pears    ? ½ cup of raw or cooked vegetables, such as carrots, cauliflower, cabbage, spinach, squash, or corn    When should I call my doctor? · You have questions about a high-fiber diet  · You have a change in your bowel movements  · You have an upset stomach  · You have a fever  · You have pain in your lower abdomen on the left side  · You have questions about your condition or care  CARE AGREEMENT:   You have the right to help plan your care  Learn about your health condition and how it may be treated  Discuss treatment options with your healthcare providers to decide what care you want to receive  You always have the right to refuse treatment  The above information is an  only  It is not intended as medical advice for individual conditions or treatments  Talk to your doctor, nurse or pharmacist before following any medical regimen to see if it is safe and effective for you    © Copyright [employer] 2022 Information is for End User's use only and may not be sold, redistributed or otherwise used for commercial purposes   All illustrations and images included in CareNotes® are the copyrighted property of A D A M , Inc  or [facility] Joanna Barakat

## 2022-05-04 NOTE — ANESTHESIA POSTPROCEDURE EVALUATION
Post-Op Assessment Note    CV Status:  Stable  Pain Score: 0    Pain management: adequate     Mental Status:  Arousable and sleepy   Hydration Status:  Stable and euvolemic   PONV Controlled:  Controlled   Airway Patency:  Patent      Post Op Vitals Reviewed: Yes      Staff: CRNA         No complications documented      BP   149/81   Temp     Pulse  66   Resp   15   SpO2   99

## 2022-06-15 ENCOUNTER — ANNUAL EXAM (OUTPATIENT)
Dept: OBGYN CLINIC | Facility: CLINIC | Age: 67
End: 2022-06-15
Payer: MEDICARE

## 2022-06-15 VITALS
DIASTOLIC BLOOD PRESSURE: 78 MMHG | BODY MASS INDEX: 33.8 KG/M2 | SYSTOLIC BLOOD PRESSURE: 120 MMHG | WEIGHT: 198 LBS | HEIGHT: 64 IN

## 2022-06-15 DIAGNOSIS — Z78.0 POSTMENOPAUSAL: ICD-10-CM

## 2022-06-15 DIAGNOSIS — Z12.31 ENCOUNTER FOR SCREENING MAMMOGRAM FOR MALIGNANT NEOPLASM OF BREAST: ICD-10-CM

## 2022-06-15 DIAGNOSIS — Z12.39 ENCOUNTER FOR SCREENING BREAST EXAMINATION: ICD-10-CM

## 2022-06-15 DIAGNOSIS — N95.2 VAGINAL ATROPHY: ICD-10-CM

## 2022-06-15 DIAGNOSIS — Z01.419 ENCOUNTER FOR WELL WOMAN EXAM: Primary | ICD-10-CM

## 2022-06-15 PROCEDURE — G0101 CA SCREEN;PELVIC/BREAST EXAM: HCPCS | Performed by: PHYSICIAN ASSISTANT

## 2022-06-15 NOTE — PROGRESS NOTES
Assessment/Plan:    No problem-specific Assessment & Plan notes found for this encounter  Diagnoses and all orders for this visit:    Encounter for well woman exam    Encounter for screening breast examination    Postmenopausal    Vaginal atrophy    Encounter for screening mammogram for malignant neoplasm of breast  -     Mammo screening bilateral w 3d & cad; Future          Subjective:      Patient ID: Steven Meadows is a 78 y o  female  Pt presents for her annual exam today--  She has no complaints  She has no bleeding or pelvic pain--postmeno  Bowel and bladder are regular  Colonoscopy--5/22  No breast concerns today  Last mammo--4/22  dexa utd      No pap today  rx mammo  Daily ca, d      The following portions of the patient's history were reviewed and updated as appropriate: allergies, current medications, past family history, past medical history, past social history, past surgical history and problem list     Review of Systems   Constitutional: Negative for chills, fever and unexpected weight change  Gastrointestinal: Negative for abdominal pain, blood in stool, constipation and diarrhea  Genitourinary: Negative  Objective:      /78   Ht 5' 4" (1 626 m)   Wt 89 8 kg (198 lb)   BMI 33 99 kg/m²          Physical Exam  Vitals and nursing note reviewed  Constitutional:       Appearance: She is well-developed  HENT:      Head: Normocephalic and atraumatic  Chest:   Breasts:      Right: No inverted nipple, mass, nipple discharge or skin change  Left: No inverted nipple, mass, nipple discharge or skin change  Abdominal:      Palpations: Abdomen is soft  Genitourinary:     Exam position: Supine  Labia:         Right: No rash, tenderness or lesion  Left: No rash, tenderness or lesion  Vagina: Normal       Cervix: No cervical motion tenderness, discharge or friability  Uterus: Absent  Adnexa:         Right: No mass, tenderness or fullness  Left: No mass, tenderness or fullness  Musculoskeletal:      Cervical back: Normal range of motion  Lymphadenopathy:      Lower Body: No right inguinal adenopathy  No left inguinal adenopathy

## 2022-08-05 ENCOUNTER — TELEMEDICINE (OUTPATIENT)
Dept: FAMILY MEDICINE CLINIC | Facility: CLINIC | Age: 67
End: 2022-08-05
Payer: MEDICARE

## 2022-08-05 DIAGNOSIS — J06.9 VIRAL URI WITH COUGH: Primary | ICD-10-CM

## 2022-08-05 PROCEDURE — 99213 OFFICE O/P EST LOW 20 MIN: CPT | Performed by: FAMILY MEDICINE

## 2022-08-05 NOTE — PROGRESS NOTES
COVID-19 Outpatient Progress Note    Assessment/Plan:    Problem List Items Addressed This Visit    None     Visit Diagnoses     Viral URI with cough    -  Primary         Disposition:     After clarifying the patient's history, my suspicion for COVID-19 infection is very low  Patient's symptoms are likely due to viral etiology given chronicity and severity of symptoms  Discussed that upper respiratory tract infections are most often due to a viral etiology  Explained that if symptoms have been present for >10 days, or if signs and symptoms improved and then worsened within 10 days then infection is more likely to be bacterial and would require treatment with antibiotics  I explained to patient that viral infections will improve on their own with time and recommended supportive care  If symptoms do not improve, consider antibiotics  I have spent 10 minutes directly with the patient  Greater than 50% of this time was spent in counseling/coordination of care regarding: risks and benefits of treatment options and impressions  Encounter provider Adeola Do MD    Provider located at [address]  [phone]    Recent Visits  No visits were found meeting these conditions  Showing recent visits within past 7 days and meeting all other requirements  Today's Visits  Date Type Provider Dept   08/05/22 Telemedicine Adeola Do MD [facility]   Showing today's visits and meeting all other requirements  Future Appointments  No visits were found meeting these conditions  Showing future appointments within next 150 days and meeting all other requirements     This virtual check-in was done via [employer] and patient was informed that this is a secure, HIPAA-compliant platform  She agrees to proceed      Patient agrees to participate in a virtual check in via telephone or video visit instead of presenting to the office to address urgent/immediate medical needs  Patient is aware this is a billable service  After connecting through [facility], the patient was identified by name and date of birth  Tesfaye New was informed that this was a telemedicine visit and that the exam was being conducted confidentially over secure lines  My office door was closed  No one else was in the room  Sailaja Elder Lancaster acknowledged consent and understanding of privacy and security of the telemedicine visit  I informed the patient that I have reviewed her record in Epic and presented the opportunity for her to ask any questions regarding the visit today  The patient agreed to participate  Verification of patient location:  Patient is located in the following state in which I hold an active license: [place]    Subjective:   Tesfaye New is a 78 y o  female who is concerned about COVID-19  Patient's symptoms include fatigue, nasal congestion (improving) and cough  Patient denies fever, chills, malaise, rhinorrhea, sore throat, anosmia, loss of taste, shortness of breath, chest tightness, abdominal pain, nausea, vomiting, diarrhea, myalgias and headaches       - Date of symptom onset: 8/1/2022      COVID-19 vaccination status: Fully vaccinated (primary series)    Exposure:   Contact with a person who is under investigation (PUI) for or who is positive for COVID-19 within the last 14 days?: No    Hospitalized recently for fever and/or lower respiratory symptoms?: No      Currently a healthcare worker that is involved in direct patient care?: No      Works in a special setting where the risk of COVID-19 transmission may be high? (this may include long-term care, correctional and jail facilities; homeless shelters; assisted-living facilities and group homes ): No      Resident in a special setting where the risk of COVID-19 transmission may be high? (this may include long-term care, correctional and jail facilities; homeless shelters; assisted-living facilities and group homes ): No      She had symptoms starting Monday with congestion, rhinorrhea  She then developed Cough on Wednesday  She reports that the cough is better today  No treatments tried  She has taken several home COVID which have been negative  Lab Results   Component Value Date    SARSCOV2 Negative 10/18/2021     Past Medical History:   Diagnosis Date    Arthritis     Colon polyp     Vertigo 05/2019     Past Surgical History:   Procedure Laterality Date    CHOLECYSTECTOMY      COLONOSCOPY      HYSTERECTOMY Bilateral     age 42     Current Outpatient Medications   Medication Sig Dispense Refill    multivitamin (THERAGRAN) TABS Take 1 tablet by mouth daily       No current facility-administered medications for this visit  No Known Allergies    Review of Systems   Constitutional: Positive for appetite change and fatigue  Negative for chills and fever  HENT: Positive for congestion (improving)  Negative for rhinorrhea, sore throat and voice change  Respiratory: Positive for cough  Negative for chest tightness and shortness of breath  Gastrointestinal: Negative for abdominal pain, diarrhea, nausea and vomiting  Musculoskeletal: Negative for myalgias  Neurological: Negative for headaches  Objective: There were no vitals filed for this visit  Physical Exam  Constitutional:       General: She is not in acute distress  Appearance: Normal appearance  She is not ill-appearing or toxic-appearing  Pulmonary:      Effort: Pulmonary effort is normal  No respiratory distress  Comments: Able to speak in complete sentences without difficulty   Musculoskeletal:      Cervical back: Normal range of motion  Neurological:      Mental Status: She is alert  VIRTUAL VISIT DISCLAIMER    Sailaja Stubbs verbally agrees to participate in [employer]   Pt is aware that [employer] could be limited without vital signs or the ability to perform a full hands-on physical exam  Sailaja Navas understands she or the provider may request at any time to terminate the video visit and request the patient to seek care or treatment in person

## 2023-06-22 ENCOUNTER — HOSPITAL ENCOUNTER (OUTPATIENT)
Dept: RADIOLOGY | Facility: MEDICAL CENTER | Age: 68
Discharge: HOME | End: 2023-06-22
Payer: MEDICARE

## 2023-06-22 VITALS — BODY MASS INDEX: 33.8 KG/M2 | HEIGHT: 64 IN | WEIGHT: 198 LBS

## 2023-06-22 DIAGNOSIS — Z12.31 ENCOUNTER FOR SCREENING MAMMOGRAM FOR MALIGNANT NEOPLASM OF BREAST: ICD-10-CM

## 2023-06-22 PROCEDURE — 77067 SCR MAMMO BI INCL CAD: CPT

## 2023-06-22 PROCEDURE — 77063 BREAST TOMOSYNTHESIS BI: CPT

## 2023-06-29 ENCOUNTER — OFFICE VISIT (OUTPATIENT)
Dept: GYNECOLOGY | Facility: CLINIC | Age: 68
End: 2023-06-29

## 2023-06-29 VITALS
HEIGHT: 64 IN | DIASTOLIC BLOOD PRESSURE: 80 MMHG | SYSTOLIC BLOOD PRESSURE: 142 MMHG | WEIGHT: 197 LBS | BODY MASS INDEX: 33.63 KG/M2

## 2023-06-29 DIAGNOSIS — N95.2 VAGINAL ATROPHY: ICD-10-CM

## 2023-06-29 DIAGNOSIS — Z12.31 SCREENING MAMMOGRAM FOR BREAST CANCER: ICD-10-CM

## 2023-06-29 DIAGNOSIS — Z78.0 POSTMENOPAUSAL: Primary | ICD-10-CM

## 2023-06-29 DIAGNOSIS — M81.0 AGE-RELATED OSTEOPOROSIS WITHOUT CURRENT PATHOLOGICAL FRACTURE: ICD-10-CM

## 2023-06-29 DIAGNOSIS — Z13.820 SCREENING FOR OSTEOPOROSIS: ICD-10-CM

## 2023-06-30 NOTE — PROGRESS NOTES
"Assessment/Plan:    No problem-specific Assessment & Plan notes found for this encounter  Diagnoses and all orders for this visit:    Postmenopausal    Vaginal atrophy    Screening mammogram for breast cancer  -     Mammo screening bilateral w 3d & cad; Future    Screening for osteoporosis  -     DXA bone density spine hip and pelvis; Future    Age-related osteoporosis without current pathological fracture  -     DXA bone density spine hip and pelvis; Future          Subjective:      Patient ID: Jocelyne Crook is a 75 y o  female  Pt presents for follow up today--  She has no complaints  She has no bleeding or pelvic pain--hyster  Bowel and bladder are regular  Colonoscopy--2022  No breast concerns today  Last mammo--2023  dexa--2018    No pap today  rx mamm  rx dexa  Daily ca, d      The following portions of the patient's history were reviewed and updated as appropriate: allergies, current medications, past family history, past medical history, past social history, past surgical history and problem list     Review of Systems   Constitutional: Negative for chills, fever and unexpected weight change  Gastrointestinal: Negative for abdominal pain, blood in stool, constipation and diarrhea  Genitourinary: Negative  Objective:      /80   Ht 5' 4\" (1 626 m)   Wt 89 4 kg (197 lb)   BMI 33 81 kg/m²          Physical Exam  Vitals and nursing note reviewed  Constitutional:       Appearance: She is well-developed  HENT:      Head: Normocephalic and atraumatic  Chest:   Breasts:     Right: No inverted nipple, mass, nipple discharge or skin change  Left: No inverted nipple, mass, nipple discharge or skin change  Abdominal:      Palpations: Abdomen is soft  Genitourinary:     Exam position: Supine  Labia:         Right: No rash, tenderness or lesion  Left: No rash, tenderness or lesion         Vagina: Normal       Cervix: No cervical motion tenderness, discharge or " friability  Uterus: Absent  Adnexa:         Right: No mass, tenderness or fullness  Left: No mass, tenderness or fullness  Musculoskeletal:      Cervical back: Normal range of motion  Lymphadenopathy:      Lower Body: No right inguinal adenopathy  No left inguinal adenopathy

## 2023-10-04 ENCOUNTER — OFFICE VISIT (OUTPATIENT)
Dept: FAMILY MEDICINE CLINIC | Facility: CLINIC | Age: 68
End: 2023-10-04
Payer: MEDICARE

## 2023-10-04 VITALS
RESPIRATION RATE: 18 BRPM | TEMPERATURE: 97.8 F | HEIGHT: 64 IN | BODY MASS INDEX: 33.89 KG/M2 | HEART RATE: 65 BPM | OXYGEN SATURATION: 96 % | SYSTOLIC BLOOD PRESSURE: 118 MMHG | DIASTOLIC BLOOD PRESSURE: 82 MMHG | WEIGHT: 198.5 LBS

## 2023-10-04 DIAGNOSIS — M25.562 CHRONIC PAIN OF BOTH KNEES: Primary | ICD-10-CM

## 2023-10-04 DIAGNOSIS — R03.0 ELEVATED BLOOD PRESSURE READING WITHOUT DIAGNOSIS OF HYPERTENSION: ICD-10-CM

## 2023-10-04 DIAGNOSIS — M25.561 CHRONIC PAIN OF BOTH KNEES: Primary | ICD-10-CM

## 2023-10-04 DIAGNOSIS — N39.46 URINARY INCONTINENCE, MIXED: ICD-10-CM

## 2023-10-04 DIAGNOSIS — G89.29 CHRONIC LEFT-SIDED LOW BACK PAIN WITH LEFT-SIDED SCIATICA: ICD-10-CM

## 2023-10-04 DIAGNOSIS — G89.29 CHRONIC PAIN OF BOTH KNEES: Primary | ICD-10-CM

## 2023-10-04 DIAGNOSIS — Z00.00 MEDICARE ANNUAL WELLNESS VISIT, SUBSEQUENT: ICD-10-CM

## 2023-10-04 DIAGNOSIS — Z23 ENCOUNTER FOR IMMUNIZATION: ICD-10-CM

## 2023-10-04 DIAGNOSIS — M54.42 CHRONIC LEFT-SIDED LOW BACK PAIN WITH LEFT-SIDED SCIATICA: ICD-10-CM

## 2023-10-04 DIAGNOSIS — E78.5 DYSLIPIDEMIA: ICD-10-CM

## 2023-10-04 DIAGNOSIS — R73.01 IMPAIRED FASTING GLUCOSE: ICD-10-CM

## 2023-10-04 PROCEDURE — G0439 PPPS, SUBSEQ VISIT: HCPCS | Performed by: FAMILY MEDICINE

## 2023-10-04 PROCEDURE — G0008 ADMIN INFLUENZA VIRUS VAC: HCPCS | Performed by: FAMILY MEDICINE

## 2023-10-04 PROCEDURE — G0444 DEPRESSION SCREEN ANNUAL: HCPCS | Performed by: FAMILY MEDICINE

## 2023-10-04 PROCEDURE — 20610 DRAIN/INJ JOINT/BURSA W/O US: CPT | Performed by: FAMILY MEDICINE

## 2023-10-04 PROCEDURE — 99214 OFFICE O/P EST MOD 30 MIN: CPT | Performed by: FAMILY MEDICINE

## 2023-10-04 PROCEDURE — 90662 IIV NO PRSV INCREASED AG IM: CPT | Performed by: FAMILY MEDICINE

## 2023-10-04 PROCEDURE — 90471 IMMUNIZATION ADMIN: CPT | Performed by: FAMILY MEDICINE

## 2023-10-04 RX ORDER — TRIAMCINOLONE ACETONIDE 40 MG/ML
40 INJECTION, SUSPENSION INTRA-ARTICULAR; INTRAMUSCULAR ONCE
Status: COMPLETED | OUTPATIENT
Start: 2023-10-04 | End: 2023-10-09

## 2023-10-04 RX ORDER — METHOCARBAMOL 500 MG/1
500 TABLET, FILM COATED ORAL 3 TIMES DAILY PRN
Qty: 60 TABLET | Refills: 0 | Status: SHIPPED | OUTPATIENT
Start: 2023-10-04

## 2023-10-04 RX ORDER — METHYLPREDNISOLONE 4 MG/1
TABLET ORAL
Qty: 21 EACH | Refills: 0 | Status: SHIPPED | OUTPATIENT
Start: 2023-10-04

## 2023-10-04 NOTE — PATIENT INSTRUCTIONS
Medicare Preventive Visit Patient Instructions  Thank you for completing your Welcome to Medicare Visit or Medicare Annual Wellness Visit today. Your next wellness visit will be due in one year (10/4/2024). The screening/preventive services that you may require over the next 5-10 years are detailed below. Some tests may not apply to you based off risk factors and/or age. Screening tests ordered at today's visit but not completed yet may show as past due. Also, please note that scanned in results may not display below. Preventive Screenings:  Service Recommendations Previous Testing/Comments   Colorectal Cancer Screening  * Colonoscopy    * Fecal Occult Blood Test (FOBT)/Fecal Immunochemical Test (FIT)  * Fecal DNA/Cologuard Test  * Flexible Sigmoidoscopy Age: 43-73 years old   Colonoscopy: every 10 years (may be performed more frequently if at higher risk)  OR  FOBT/FIT: every 1 year  OR  Cologuard: every 3 years  OR  Sigmoidoscopy: every 5 years  Screening may be recommended earlier than age 39 if at higher risk for colorectal cancer. Also, an individualized decision between you and your healthcare provider will decide whether screening between the ages of 77-80 would be appropriate. Colonoscopy: 05/04/2022  FOBT/FIT: Not on file  Cologuard: Not on file  Sigmoidoscopy: Not on file    Screening Current     Breast Cancer Screening Age: 36 years old  Frequency: every 1-2 years  Not required if history of left and right mastectomy Mammogram: 06/22/2023    Screening Current   Cervical Cancer Screening Between the ages of 21-29, pap smear recommended once every 3 years. Between the ages of 32-69, can perform pap smear with HPV co-testing every 5 years.    Recommendations may differ for women with a history of total hysterectomy, cervical cancer, or abnormal pap smears in past. Pap Smear: 06/15/2022    Screening Not Indicated   Hepatitis C Screening Once for adults born between 1945 and 1965  More frequently in patients at high risk for Hepatitis C Hep C Antibody: 05/18/2021    Screening Current   Diabetes Screening 1-2 times per year if you're at risk for diabetes or have pre-diabetes Fasting glucose: 109 mg/dL (5/18/2021)  A1C: No results in last 5 years (No results in last 5 years)      Cholesterol Screening Once every 5 years if you don't have a lipid disorder. May order more often based on risk factors. Lipid panel: 05/18/2021    Screening Current     Other Preventive Screenings Covered by Medicare:  Abdominal Aortic Aneurysm (AAA) Screening: covered once if your at risk. You're considered to be at risk if you have a family history of AAA. Lung Cancer Screening: covers low dose CT scan once per year if you meet all of the following conditions: (1) Age 48-67; (2) No signs or symptoms of lung cancer; (3) Current smoker or have quit smoking within the last 15 years; (4) You have a tobacco smoking history of at least 20 pack years (packs per day multiplied by number of years you smoked); (5) You get a written order from a healthcare provider. Glaucoma Screening: covered annually if you're considered high risk: (1) You have diabetes OR (2) Family history of glaucoma OR (3)  aged 48 and older OR (3)  American aged 72 and older  Osteoporosis Screening: covered every 2 years if you meet one of the following conditions: (1) You're estrogen deficient and at risk for osteoporosis based off medical history and other findings; (2) Have a vertebral abnormality; (3) On glucocorticoid therapy for more than 3 months; (4) Have primary hyperparathyroidism; (5) On osteoporosis medications and need to assess response to drug therapy. Last bone density test (DXA Scan): 12/03/2018. HIV Screening: covered annually if you're between the age of 14-79. Also covered annually if you are younger than 13 and older than 72 with risk factors for HIV infection.  For pregnant patients, it is covered up to 3 times per pregnancy. Immunizations:  Immunization Recommendations   Influenza Vaccine Annual influenza vaccination during flu season is recommended for all persons aged >= 6 months who do not have contraindications   Pneumococcal Vaccine   * Pneumococcal conjugate vaccine = PCV13 (Prevnar 13), PCV15 (Vaxneuvance), PCV20 (Prevnar 20)  * Pneumococcal polysaccharide vaccine = PPSV23 (Pneumovax) Adults 20-63 years old: 1-3 doses may be recommended based on certain risk factors  Adults 72 years old: 1-2 doses may be recommended based off what pneumonia vaccine you previously received   Hepatitis B Vaccine 3 dose series if at intermediate or high risk (ex: diabetes, end stage renal disease, liver disease)   Tetanus (Td) Vaccine - COST NOT COVERED BY MEDICARE PART B Following completion of primary series, a booster dose should be given every 10 years to maintain immunity against tetanus. Td may also be given as tetanus wound prophylaxis. Tdap Vaccine - COST NOT COVERED BY MEDICARE PART B Recommended at least once for all adults. For pregnant patients, recommended with each pregnancy. Shingles Vaccine (Shingrix) - COST NOT COVERED BY MEDICARE PART B  2 shot series recommended in those aged 48 and above     Health Maintenance Due:      Topic Date Due    Breast Cancer Screening: Mammogram  06/22/2024    Colorectal Cancer Screening  05/02/2029    Hepatitis C Screening  Completed     Immunizations Due:      Topic Date Due    Pneumococcal Vaccine: 65+ Years (1 - PCV) Never done    COVID-19 Vaccine (4 - Pfizer series) 03/01/2023    Influenza Vaccine (1) 09/01/2023     Advance Directives   What are advance directives? Advance directives are legal documents that state your wishes and plans for medical care. These plans are made ahead of time in case you lose your ability to make decisions for yourself. Advance directives can apply to any medical decision, such as the treatments you want, and if you want to donate organs.    What are the types of advance directives? There are many types of advance directives, and each state has rules about how to use them. You may choose a combination of any of the following:  Living will: This is a written record of the treatment you want. You can also choose which treatments you do not want, which to limit, and which to stop at a certain time. This includes surgery, medicine, IV fluid, and tube feedings. Durable power of  for [facility]): This is a written record that states who you want to make healthcare choices for you when you are unable to make them for yourself. This person, called a proxy, is usually a family member or a friend. You may choose more than 1 proxy. Do not resuscitate (DNR) order:  A DNR order is used in case your heart stops beating or you stop breathing. It is a request not to have certain forms of treatment, such as CPR. A DNR order may be included in other types of advance directives. Medical directive: This covers the care that you want if you are in a coma, near death, or unable to make decisions for yourself. You can list the treatments you want for each condition. Treatment may include pain medicine, surgery, blood transfusions, dialysis, IV or tube feedings, and a ventilator (breathing machine). Values history: This document has questions about your views, beliefs, and how you feel and think about life. This information can help others choose the care that you would choose. Why are advance directives important? An advance directive helps you control your care. Although spoken wishes may be used, it is better to have your wishes written down. Spoken wishes can be misunderstood, or not followed. Treatments may be given even if you do not want them. An advance directive may make it easier for your family to make difficult choices about your care. Urinary Incontinence   Urinary incontinence (UI)  is when you lose control of your bladder.  UI develops because your bladder cannot store or empty urine properly. The 3 most common types of UI are stress incontinence, urge incontinence, or both. Medicines:   May be given to help strengthen your bladder control. Report any side effects of medication to your healthcare provider. Do pelvic muscle exercises often:  Your pelvic muscles help you stop urinating. Squeeze these muscles tight for 5 seconds, then relax for 5 seconds. Gradually work up to squeezing for 10 seconds. Do 3 sets of 15 repetitions a day, or as directed. This will help strengthen your pelvic muscles and improve bladder control. Train your bladder:  Go to the bathroom at set times, such as every 2 hours, even if you do not feel the urge to go. You can also try to hold your urine when you feel the urge to go. For example, hold your urine for 5 minutes when you feel the urge to go. As that becomes easier, hold your urine for 10 minutes. Self-care:   Keep a UI record. Write down how often you leak urine and how much you leak. Make a note of what you were doing when you leaked urine. Drink liquids as directed. You may need to limit the amount of liquid you drink to help control your urine leakage. Do not drink any liquid right before you go to bed. Limit or do not have drinks that contain caffeine or alcohol. Prevent constipation. Eat a variety of high-fiber foods. Good examples are high-fiber cereals, beans, vegetables, and whole-grain breads. Walking is the best way to trigger your intestines to have a bowel movement. Exercise regularly and maintain a healthy weight. Weight loss and exercise will decrease pressure on your bladder and help you control your leakage. Use a catheter as directed  to help empty your bladder. A catheter is a tiny, plastic tube that is put into your bladder to drain your urine. Go to behavior therapy as directed. Behavior therapy may be used to help you learn to control your urge to urinate.     Weight Management   Why it is important to manage your weight:  Being overweight increases your risk of health conditions such as heart disease, high blood pressure, type 2 diabetes, and certain types of cancer. It can also increase your risk for osteoarthritis, sleep apnea, and other respiratory problems. Aim for a slow, steady weight loss. Even a small amount of weight loss can lower your risk of health problems. How to lose weight safely:  A safe and healthy way to lose weight is to eat fewer calories and get regular exercise. You can lose up about 1 pound a week by decreasing the number of calories you eat by 500 calories each day. Healthy meal plan for weight management:  A healthy meal plan includes a variety of foods, contains fewer calories, and helps you stay healthy. A healthy meal plan includes the following:  Eat whole-grain foods more often. A healthy meal plan should contain fiber. Fiber is the part of grains, fruits, and vegetables that is not broken down by your body. Whole-grain foods are healthy and provide extra fiber in your diet. Some examples of whole-grain foods are whole-wheat breads and pastas, oatmeal, brown rice, and bulgur. Eat a variety of vegetables every day. Include dark, leafy greens such as spinach, kale, tara greens, and mustard greens. Eat yellow and orange vegetables such as carrots, sweet potatoes, and winter squash. Eat a variety of fruits every day. Choose fresh or canned fruit (canned in its own juice or light syrup) instead of juice. Fruit juice has very little or no fiber. Eat low-fat dairy foods. Drink fat-free (skim) milk or 1% milk. Eat fat-free yogurt and low-fat cottage cheese. Try low-fat cheeses such as mozzarella and other reduced-fat cheeses. Choose meat and other protein foods that are low in fat. Choose beans or other legumes such as split peas or lentils. Choose fish, skinless poultry (chicken or turkey), or lean cuts of red meat (beef or pork).  Before you cook meat or poultry, cut off any visible fat. Use less fat and oil. Try baking foods instead of frying them. Add less fat, such as margarine, sour cream, regular salad dressing and mayonnaise to foods. Eat fewer high-fat foods. Some examples of high-fat foods include french fries, doughnuts, ice cream, and cakes. Eat fewer sweets. Limit foods and drinks that are high in sugar. This includes candy, cookies, regular soda, and sweetened drinks. Exercise:  Exercise at least 30 minutes per day on most days of the week. Some examples of exercise include walking, biking, dancing, and swimming. You can also fit in more physical activity by taking the stairs instead of the elevator or parking farther away from stores. Ask your healthcare provider about the best exercise plan for you. © Copyright [employer] 2018 Information is for End User's use only and may not be sold, redistributed or otherwise used for commercial purposes.  All illustrations and images included in CareNotes® are the copyrighted property of [employer] or [address] [place] [address]

## 2023-10-04 NOTE — PROGRESS NOTES
Assessment and Plan:     Problem List Items Addressed This Visit        Endocrine    Impaired fasting glucose    Relevant Orders    Comprehensive metabolic panel       Nervous and Auditory    Chronic left-sided low back pain with left-sided sciatica     Following with chiropractor in [place]. Limit Ibuprofen. Patient has an upcoming trip to [place] in December. We will send her with a Medrol Dosepak and muscle relaxers to take on her trip. She will only take this medication if her back flares up while traveling. Also recommend obtaining Salonpas patches for her flight         Relevant Medications    methylPREDNISolone 4 MG tablet therapy pack    methocarbamol (ROBAXIN) 500 mg tablet       Other    Elevated blood pressure reading without diagnosis of hypertension    Relevant Orders    Comprehensive metabolic panel    CBC and Platelet    Dyslipidemia    Relevant Orders    Lipid panel    Bilateral knee pain - Primary     Bilateral medial knee pain with crepitus. Trial CSI. Xrays            Relevant Medications    triamcinolone acetonide (KENALOG-40) 40 mg/mL injection 40 mg    triamcinolone acetonide (KENALOG-40) 40 mg/mL injection 40 mg    Other Relevant Orders    XR knee 3 vw right non injury    XR knee 3 vw left non injury    Large joint arthrocentesis: L knee    Large joint arthrocentesis: R knee    Urinary incontinence, mixed     Kegal exercises         Other Visit Diagnoses     Medicare annual wellness visit, subsequent        Encounter for immunization        Relevant Orders    influenza vaccine, high-dose, PF 0.7 mL (FLUZONE HIGH-DOSE) (Completed)        BMI Counseling: Body mass index is 34.07 kg/m². The BMI is above normal. Nutrition recommendations include decreasing portion sizes, consuming healthier snacks, reducing intake of saturated and trans fat and reducing intake of cholesterol. Exercise recommendations include moderate physical activity 150 minutes/week. No pharmacotherapy was ordered. Patient referred to PCP. Rationale for BMI follow-up plan is due to patient being overweight or obese. Depression Screening and Follow-up Plan: Patient was screened for depression during today's encounter. They screened negative with a PHQ-2 score of 0. Urinary Incontinence Plan of Care: counseling topics discussed: practice Kegel (pelvic floor strengthening) exercises and limiting fluid intake 3-4 hours before bed. Preventive health issues were discussed with patient, and age appropriate screening tests were ordered as noted in patient's After Visit Summary. Personalized health advice and appropriate referrals for health education or preventive services given if needed, as noted in patient's After Visit Summary. Large joint arthrocentesis: L knee  Universal Protocol:  Consent: Verbal consent obtained. Consent given by: patient  Patient understanding: patient states understanding of the procedure being performed  Site marked: the operative site was marked  Patient identity confirmed: verbally with patient    Supporting Documentation  Indications: pain   Procedure Details  Location: knee - L knee  Preparation: Patient was prepped and draped in the usual sterile fashion  Needle size: 22 G  Ultrasound guidance: no  Approach: anterolateral    Aspirate: clear    Patient tolerance: patient tolerated the procedure well with no immediate complications  Dressing:  Sterile dressing applied    Large joint arthrocentesis: R knee  Universal Protocol:  Consent: Verbal consent obtained.   Consent given by: patient  Patient understanding: patient states understanding of the procedure being performed  Site marked: the operative site was marked  Patient identity confirmed: verbally with patient    Supporting Documentation  Indications: pain   Procedure Details  Location: knee - R knee  Preparation: Patient was prepped and draped in the usual sterile fashion  Needle size: 22 G  Ultrasound guidance: no  Approach: anterolateral    Aspirate: clear    Patient tolerance: patient tolerated the procedure well with no immediate complications  Dressing:  Sterile dressing applied             History of Present Illness:     Patient presents for a Medicare Wellness Visit        Patient presents today for annual wellness visit and follow-up. She is endorsing bilateral knee pain. Pain is localized to the inside of her knees. Worse with activity. She has lower back pain which she follows with a chiropractor. States she was taking Motrin and Tylenol pretty consistently over the summer. Unfortunately her mom passed away this past May. Overall she is doing well. Patient Care Team:  Mari Miller MD as PCP - General (Family Medicine)     Review of Systems:     Review of Systems   Constitutional: Negative for fatigue and fever. HENT: Negative for sore throat. Eyes: Negative for visual disturbance. Respiratory: Negative for cough, chest tightness and shortness of breath. Cardiovascular: Negative for chest pain, palpitations and leg swelling. Gastrointestinal: Negative for abdominal pain, constipation, diarrhea and nausea. Endocrine: Negative for cold intolerance and heat intolerance. Genitourinary: Negative for flank pain. Musculoskeletal: Positive for back pain. Negative for neck pain. Bilateral knee pain  Low back pain   Skin: Negative for rash. Neurological: Negative for headaches. Psychiatric/Behavioral: Negative for behavioral problems and confusion.         Problem List:     Patient Active Problem List   Diagnosis   • Elevated blood pressure reading without diagnosis of hypertension   • Impaired fasting glucose   • Dyslipidemia   • Class 1 obesity due to excess calories without serious comorbidity with body mass index (BMI) of 33.0 to 33.9 in adult   • Chronic left-sided low back pain with left-sided sciatica   • Bilateral knee pain   • Urinary incontinence, mixed      Past Medical and Surgical History:     Past Medical History:   Diagnosis Date   • Arthritis    • Colon polyp    • HL (hearing loss) 2007   • Vertigo 05/2019     Past Surgical History:   Procedure Laterality Date   • CHOLECYSTECTOMY     • COLONOSCOPY     • HYSTERECTOMY Bilateral     age 42      Family History:     Family History   Problem Relation Age of Onset   • Prostate cancer Father 58   • Stroke Father    • Hypertension Father    • Hyperlipidemia Father    • Hearing loss Father    • Stomach cancer Maternal Grandfather 61   • Stomach cancer Paternal Grandmother 80   • Cancer Paternal Grandfather 48   • Colon cancer Maternal Aunt 48   • Vision loss Mother    • Atrial fibrillation Mother    • Glaucoma Mother    • Macular degeneration Mother    • Dementia Mother    • Hypertension Mother    • Hyperlipidemia Mother    • Hearing loss Mother    • No Known Problems Sister    • No Known Problems Daughter    • No Known Problems Maternal Grandmother    • No Known Problems Sister    • No Known Problems Son    • No Known Problems Daughter    • No Known Problems Daughter    • No Known Problems Paternal Aunt       Social History:     Social History     Socioeconomic History   • Marital status: /Civil Union     Spouse name: None   • Number of children: None   • Years of education: None   • Highest education level: None   Occupational History   • None   Tobacco Use   • Smoking status: Never   • Smokeless tobacco: Never   Vaping Use   • Vaping Use: Never used   Substance and Sexual Activity   • Alcohol use:  Yes     Alcohol/week: 1.0 standard drink of alcohol     Types: 1 Glasses of wine per week     Comment: very rarely   • Drug use: Never   • Sexual activity: Yes     Partners: Male     Birth control/protection: Female Sterilization     Comment: Hysterectomy   Other Topics Concern   • None   Social History Narrative   • None     Social Determinants of Health     Financial Resource Strain: Low Risk  (9/27/2023)    Overall Financial Resource Strain (CARDIA)    • Difficulty of Paying Living Expenses: Not hard at all   Food Insecurity: Not on file   Transportation Needs: No Transportation Needs (9/27/2023)    PRAPARE - Transportation    • Lack of Transportation (Medical): No    • Lack of Transportation (Non-Medical):  No   Physical Activity: Not on file   Stress: Not on file   Social Connections: Not on file   Intimate Partner Violence: Not on file   Housing Stability: Not on file      Medications and Allergies:     Current Outpatient Medications   Medication Sig Dispense Refill   • methocarbamol (ROBAXIN) 500 mg tablet Take 1 tablet (500 mg total) by mouth 3 (three) times a day as needed for muscle spasms 60 tablet 0   • methylPREDNISolone 4 MG tablet therapy pack Use as directed on package 21 each 0   • multivitamin (THERAGRAN) TABS Take 1 tablet by mouth daily       Current Facility-Administered Medications   Medication Dose Route Frequency Provider Last Rate Last Admin   • triamcinolone acetonide (KENALOG-40) 40 mg/mL injection 40 mg  40 mg Intra-articular Once Diana Schuster MD       • triamcinolone acetonide (KENALOG-40) 40 mg/mL injection 40 mg  40 mg Intra-articular Once Diana Schuster MD         No Known Allergies   Immunizations:     Immunization History   Administered Date(s) Administered   • COVID-19 PFIZER VACCINE 0.3 ML IM 02/17/2021, 03/08/2021   • COVID-19 Pfizer Vac BIVALENT Gaudencio-sucrose 12 Yr+ IM 11/01/2022   • INFLUENZA 11/01/2021   • Influenza, high dose seasonal 0.7 mL 09/15/2020, 11/01/2021, 11/01/2022, 10/04/2023   • Zoster Vaccine Recombinant 06/17/2020, 09/29/2020      Health Maintenance:         Topic Date Due   • Breast Cancer Screening: Mammogram  06/22/2024   • Colorectal Cancer Screening  05/02/2029   • Hepatitis C Screening  Completed         Topic Date Due   • Pneumococcal Vaccine: 65+ Years (1 - PCV) Never done   • COVID-19 Vaccine (4 - Pfizer series) 03/01/2023      Medicare Screening Tests and Risk Assessments:     Sailaja is here for her Subsequent Wellness visit. Last Medicare Wellness visit information reviewed, patient interviewed and updates made to the record today. Health Risk Assessment:   Patient rates overall health as very good. Patient feels that their physical health rating is same. Patient is satisfied with their life. Eyesight was rated as same. Hearing was rated as slightly worse. Patient feels that their emotional and mental health rating is same. Patients states they are never, rarely angry. Patient states they are never, rarely unusually tired/fatigued. Pain experienced in the last 7 days has been some. Patient's pain rating has been 3/10. Patient states that she has experienced no weight loss or gain in last 6 months. Depression Screening:   PHQ-2 Score: 0      Fall Risk Screening: In the past year, patient has experienced: no history of falling in past year      Urinary Incontinence Screening:   Patient has leaked urine accidently in the last six months. By Laughing or jumping    Home Safety:  Patient does not have trouble with stairs inside or outside of their home. Patient has working smoke alarms and has no working carbon monoxide detector. Home safety hazards include: none. Nutrition:   Current diet is Regular. Medications:   Patient is currently taking over-the-counter supplements. OTC medications include: Occasionally ibuprofen. Patient is able to manage medications. Activities of Daily Living (ADLs)/Instrumental Activities of Daily Living (IADLs):   Walk and transfer into and out of bed and chair?: Yes  Dress and groom yourself?: Yes    Bathe or shower yourself?: Yes    Feed yourself?  Yes  Do your laundry/housekeeping?: Yes  Manage your money, pay your bills and track your expenses?: Yes  Make your own meals?: Yes    Do your own shopping?: Yes    Previous Hospitalizations:   Any hospitalizations or ED visits within the last 12 months?: No      Advance Care Planning:   Living will: Yes    Durable POA for healthcare: Yes    Advanced directive: Yes    Advanced directive counseling given: Yes      Cognitive Screening:   Provider or family/friend/caregiver concerned regarding cognition?: No    PREVENTIVE SCREENINGS      Cardiovascular Screening:    General: Screening Current      Diabetes Screening:     General: Screening Current      Colorectal Cancer Screening:     General: Screening Current      Breast Cancer Screening:     General: Screening Current      Cervical Cancer Screening:    General: Screening Not Indicated      Osteoporosis Screening:    General: History Osteoporosis and Risks and Benefits Discussed      Lung Cancer Screening:     General: Screening Not Indicated      Hepatitis C Screening:    General: Screening Current    Screening, Brief Intervention, and Referral to Treatment (SBIRT)    Screening  Typical number of drinks in a day: 0  Typical number of drinks in a week: 0  Interpretation: Low risk drinking behavior. AUDIT-C Screenin) How often did you have a drink containing alcohol in the past year? monthly or less  2) How many drinks did you have on a typical day when you were drinking in the past year? 0  3) How often did you have 6 or more drinks on one occasion in the past year? never    AUDIT-C Score: 1  Interpretation: Score 0-2 (female): Negative screen for alcohol misuse    Single Item Drug Screening:  How often have you used an illegal drug (including marijuana) or a prescription medication for non-medical reasons in the past year? never    Single Item Drug Screen Score: 0  Interpretation: Negative screen for possible drug use disorder    Annual Depression Screening  Time spent screening and evaluating the patient for depression during today's encounter was 5 minutes. Other Counseling Topics:   Car/seat belt/driving safety, skin self-exam, sunscreen and calcium and vitamin D intake and regular weightbearing exercise.      No results found.     Physical Exam:     /82 (BP Location: Left arm, Patient Position: Sitting, Cuff Size: Standard)   Pulse 65   Temp 97.8 °F (36.6 °C) (Tympanic)   Resp 18   Ht 5' 4" (1.626 m)   Wt 90 kg (198 lb 8 oz)   SpO2 96%   BMI 34.07 kg/m²     Physical Exam  Vitals and nursing note reviewed. Constitutional:       Appearance: Normal appearance. She is well-developed. HENT:      Head: Normocephalic and atraumatic. Eyes:      Extraocular Movements: Extraocular movements intact. Pupils: Pupils are equal, round, and reactive to light. Cardiovascular:      Rate and Rhythm: Normal rate and regular rhythm. Pulmonary:      Effort: Pulmonary effort is normal.      Breath sounds: Normal breath sounds. Abdominal:      General: Bowel sounds are normal.      Palpations: Abdomen is soft. Musculoskeletal:         General: Tenderness present. Cervical back: Normal range of motion. Comments: Bilateral crepitus   Bilateral tenderness to palpation medial aspect knee   Skin:     General: Skin is warm and dry. Neurological:      General: No focal deficit present. Mental Status: She is alert and oriented to person, place, and time.    Psychiatric:         Mood and Affect: Mood normal.         Speech: Speech normal.         Behavior: Behavior normal.          Leonarda Larios MD

## 2023-10-04 NOTE — ASSESSMENT & PLAN NOTE
Bilateral medial knee pain with crepitus. Trial CSI.  Xrays
Following with chiropractor in [address] Limit Ibuprofen. Patient has an upcoming trip to [facility] in December. We will send her with a Medrol Dosepak and muscle relaxers to take on her trip. She will only take this medication if her back flares up while traveling.   Also recommend obtaining Salonpas patches for her flight
Mayra moya
0

## 2023-10-09 RX ADMIN — TRIAMCINOLONE ACETONIDE 40 MG: 40 INJECTION, SUSPENSION INTRA-ARTICULAR; INTRAMUSCULAR at 12:15

## 2023-10-09 RX ADMIN — TRIAMCINOLONE ACETONIDE 40 MG: 40 INJECTION, SUSPENSION INTRA-ARTICULAR; INTRAMUSCULAR at 12:16

## 2023-10-13 ENCOUNTER — APPOINTMENT (OUTPATIENT)
Dept: LAB | Facility: MEDICAL CENTER | Age: 68
End: 2023-10-13
Payer: MEDICARE

## 2023-10-13 ENCOUNTER — APPOINTMENT (OUTPATIENT)
Dept: RADIOLOGY | Facility: MEDICAL CENTER | Age: 68
End: 2023-10-13
Payer: MEDICARE

## 2023-10-13 DIAGNOSIS — R03.0 ELEVATED BLOOD PRESSURE READING WITHOUT DIAGNOSIS OF HYPERTENSION: ICD-10-CM

## 2023-10-13 DIAGNOSIS — M25.562 CHRONIC PAIN OF BOTH KNEES: ICD-10-CM

## 2023-10-13 DIAGNOSIS — R73.01 IMPAIRED FASTING GLUCOSE: ICD-10-CM

## 2023-10-13 DIAGNOSIS — M25.561 CHRONIC PAIN OF BOTH KNEES: ICD-10-CM

## 2023-10-13 DIAGNOSIS — G89.29 CHRONIC PAIN OF BOTH KNEES: ICD-10-CM

## 2023-10-13 DIAGNOSIS — E78.5 DYSLIPIDEMIA: ICD-10-CM

## 2023-10-13 LAB
ALBUMIN SERPL BCP-MCNC: 4.2 G/DL (ref 3.5–5)
ALP SERPL-CCNC: 70 U/L (ref 34–104)
ALT SERPL W P-5'-P-CCNC: 30 U/L (ref 7–52)
ANION GAP SERPL CALCULATED.3IONS-SCNC: 9 MMOL/L
AST SERPL W P-5'-P-CCNC: 20 U/L (ref 13–39)
BILIRUB SERPL-MCNC: 0.91 MG/DL (ref 0.2–1)
BUN SERPL-MCNC: 19 MG/DL (ref 5–25)
CALCIUM SERPL-MCNC: 9.9 MG/DL (ref 8.4–10.2)
CHLORIDE SERPL-SCNC: 105 MMOL/L (ref 96–108)
CHOLEST SERPL-MCNC: 239 MG/DL
CO2 SERPL-SCNC: 28 MMOL/L (ref 21–32)
CREAT SERPL-MCNC: 0.91 MG/DL (ref 0.6–1.3)
ERYTHROCYTE [DISTWIDTH] IN BLOOD BY AUTOMATED COUNT: 12.9 % (ref 11.6–15.1)
GFR SERPL CREATININE-BSD FRML MDRD: 64 ML/MIN/1.73SQ M
GLUCOSE P FAST SERPL-MCNC: 87 MG/DL (ref 65–99)
HCT VFR BLD AUTO: 48.9 % (ref 34.8–46.1)
HDLC SERPL-MCNC: 57 MG/DL
HGB BLD-MCNC: 15.5 G/DL (ref 11.5–15.4)
LDLC SERPL CALC-MCNC: 156 MG/DL (ref 0–100)
MCH RBC QN AUTO: 31.6 PG (ref 26.8–34.3)
MCHC RBC AUTO-ENTMCNC: 31.7 G/DL (ref 31.4–37.4)
MCV RBC AUTO: 100 FL (ref 82–98)
NONHDLC SERPL-MCNC: 182 MG/DL
PLATELET # BLD AUTO: 355 THOUSANDS/UL (ref 149–390)
PMV BLD AUTO: 11.1 FL (ref 8.9–12.7)
POTASSIUM SERPL-SCNC: 5.2 MMOL/L (ref 3.5–5.3)
PROT SERPL-MCNC: 6.9 G/DL (ref 6.4–8.4)
RBC # BLD AUTO: 4.9 MILLION/UL (ref 3.81–5.12)
SODIUM SERPL-SCNC: 142 MMOL/L (ref 135–147)
TRIGL SERPL-MCNC: 132 MG/DL
WBC # BLD AUTO: 7.78 THOUSAND/UL (ref 4.31–10.16)

## 2023-10-13 PROCEDURE — 80061 LIPID PANEL: CPT

## 2023-10-13 PROCEDURE — 73562 X-RAY EXAM OF KNEE 3: CPT

## 2023-10-13 PROCEDURE — 36415 COLL VENOUS BLD VENIPUNCTURE: CPT

## 2023-10-13 PROCEDURE — 80053 COMPREHEN METABOLIC PANEL: CPT

## 2023-10-13 PROCEDURE — 85027 COMPLETE CBC AUTOMATED: CPT

## 2023-11-27 ENCOUNTER — HOSPITAL ENCOUNTER (OUTPATIENT)
Dept: RADIOLOGY | Facility: MEDICAL CENTER | Age: 68
Discharge: HOME | End: 2023-11-27
Payer: MEDICARE

## 2023-11-27 VITALS — HEIGHT: 64 IN | WEIGHT: 191.7 LBS | BODY MASS INDEX: 32.73 KG/M2

## 2023-11-27 DIAGNOSIS — Z13.820 SCREENING FOR OSTEOPOROSIS: ICD-10-CM

## 2023-11-27 DIAGNOSIS — M81.0 AGE-RELATED OSTEOPOROSIS WITHOUT CURRENT PATHOLOGICAL FRACTURE: ICD-10-CM

## 2023-11-27 PROCEDURE — 77080 DXA BONE DENSITY AXIAL: CPT

## 2024-01-10 ENCOUNTER — VBI (OUTPATIENT)
Dept: ADMINISTRATIVE | Facility: OTHER | Age: 69
End: 2024-01-10

## 2024-04-24 DIAGNOSIS — Z00.6 ENCOUNTER FOR EXAMINATION FOR NORMAL COMPARISON OR CONTROL IN CLINICAL RESEARCH PROGRAM: ICD-10-CM

## 2024-10-03 ENCOUNTER — RA CDI HCC (OUTPATIENT)
Dept: OTHER | Facility: HOSPITAL | Age: 69
End: 2024-10-03

## 2024-10-09 ENCOUNTER — OFFICE VISIT (OUTPATIENT)
Dept: FAMILY MEDICINE CLINIC | Facility: CLINIC | Age: 69
End: 2024-10-09
Payer: MEDICARE

## 2024-10-09 VITALS
SYSTOLIC BLOOD PRESSURE: 118 MMHG | BODY MASS INDEX: 33.63 KG/M2 | OXYGEN SATURATION: 98 % | RESPIRATION RATE: 18 BRPM | TEMPERATURE: 97.8 F | HEIGHT: 64 IN | HEART RATE: 61 BPM | WEIGHT: 197 LBS | DIASTOLIC BLOOD PRESSURE: 72 MMHG

## 2024-10-09 DIAGNOSIS — E66.811 CLASS 1 OBESITY DUE TO EXCESS CALORIES WITHOUT SERIOUS COMORBIDITY WITH BODY MASS INDEX (BMI) OF 33.0 TO 33.9 IN ADULT: ICD-10-CM

## 2024-10-09 DIAGNOSIS — Z00.00 MEDICARE ANNUAL WELLNESS VISIT, SUBSEQUENT: ICD-10-CM

## 2024-10-09 DIAGNOSIS — Z23 ENCOUNTER FOR IMMUNIZATION: ICD-10-CM

## 2024-10-09 DIAGNOSIS — G89.29 CHRONIC PAIN OF BOTH KNEES: ICD-10-CM

## 2024-10-09 DIAGNOSIS — M25.562 CHRONIC PAIN OF BOTH KNEES: ICD-10-CM

## 2024-10-09 DIAGNOSIS — Z12.31 SCREENING MAMMOGRAM FOR BREAST CANCER: ICD-10-CM

## 2024-10-09 DIAGNOSIS — E66.09 CLASS 1 OBESITY DUE TO EXCESS CALORIES WITHOUT SERIOUS COMORBIDITY WITH BODY MASS INDEX (BMI) OF 33.0 TO 33.9 IN ADULT: ICD-10-CM

## 2024-10-09 DIAGNOSIS — M25.561 CHRONIC PAIN OF BOTH KNEES: ICD-10-CM

## 2024-10-09 DIAGNOSIS — E78.5 DYSLIPIDEMIA: Primary | ICD-10-CM

## 2024-10-09 PROCEDURE — 90662 IIV NO PRSV INCREASED AG IM: CPT | Performed by: FAMILY MEDICINE

## 2024-10-09 PROCEDURE — G0439 PPPS, SUBSEQ VISIT: HCPCS | Performed by: FAMILY MEDICINE

## 2024-10-09 PROCEDURE — 99214 OFFICE O/P EST MOD 30 MIN: CPT | Performed by: FAMILY MEDICINE

## 2024-10-09 PROCEDURE — G0008 ADMIN INFLUENZA VIRUS VAC: HCPCS | Performed by: FAMILY MEDICINE

## 2024-10-09 PROCEDURE — G0444 DEPRESSION SCREEN ANNUAL: HCPCS | Performed by: FAMILY MEDICINE

## 2024-10-09 NOTE — ASSESSMENT & PLAN NOTE
Lab Results   Component Value Date    CHOLESTEROL 239 (H) 10/13/2023    TRIG 132 10/13/2023    HDL 57 10/13/2023    LDLCALC 156 (H) 10/13/2023     Cholesterol remains elevated.  Plan to repeat lipid panel.  If elevated can consider starting statin.  Orders:    Lipid panel; Future

## 2024-10-09 NOTE — PATIENT INSTRUCTIONS
Medicare Preventive Visit Patient Instructions  Thank you for completing your Welcome to Medicare Visit or Medicare Annual Wellness Visit today. Your next wellness visit will be due in one year (10/10/2025).  The screening/preventive services that you may require over the next 5-10 years are detailed below. Some tests may not apply to you based off risk factors and/or age. Screening tests ordered at today's visit but not completed yet may show as past due. Also, please note that scanned in results may not display below.  Preventive Screenings:  Service Recommendations Previous Testing/Comments   Colorectal Cancer Screening  * Colonoscopy    * Fecal Occult Blood Test (FOBT)/Fecal Immunochemical Test (FIT)  * Fecal DNA/Cologuard Test  * Flexible Sigmoidoscopy Age: 45-75 years old   Colonoscopy: every 10 years (may be performed more frequently if at higher risk)  OR  FOBT/FIT: every 1 year  OR  Cologuard: every 3 years  OR  Sigmoidoscopy: every 5 years  Screening may be recommended earlier than age 45 if at higher risk for colorectal cancer. Also, an individualized decision between you and your healthcare provider will decide whether screening between the ages of 76-85 would be appropriate. Colonoscopy: 05/04/2022  FOBT/FIT: Not on file  Cologuard: Not on file  Sigmoidoscopy: Not on file          Breast Cancer Screening Age: 40+ years old  Frequency: every 1-2 years  Not required if history of left and right mastectomy Mammogram: 06/22/2023        Cervical Cancer Screening Between the ages of 21-29, pap smear recommended once every 3 years.   Between the ages of 30-65, can perform pap smear with HPV co-testing every 5 years.   Recommendations may differ for women with a history of total hysterectomy, cervical cancer, or abnormal pap smears in past. Pap Smear: 06/15/2022        Hepatitis C Screening Once for adults born between 1945 and 1965  More frequently in patients at high risk for Hepatitis C Hep C Antibody:  05/18/2021        Diabetes Screening 1-2 times per year if you're at risk for diabetes or have pre-diabetes Fasting glucose: 87 mg/dL (10/13/2023)  A1C: No results in last 5 years (No results in last 5 years)      Cholesterol Screening Once every 5 years if you don't have a lipid disorder. May order more often based on risk factors. Lipid panel: 10/13/2023          Other Preventive Screenings Covered by Medicare:  Abdominal Aortic Aneurysm (AAA) Screening: covered once if your at risk. You're considered to be at risk if you have a family history of AAA.  Lung Cancer Screening: covers low dose CT scan once per year if you meet all of the following conditions: (1) Age 55-77; (2) No signs or symptoms of lung cancer; (3) Current smoker or have quit smoking within the last 15 years; (4) You have a tobacco smoking history of at least 20 pack years (packs per day multiplied by number of years you smoked); (5) You get a written order from a healthcare provider.  Glaucoma Screening: covered annually if you're considered high risk: (1) You have diabetes OR (2) Family history of glaucoma OR (3)  aged 50 and older OR (4)  American aged 65 and older  Osteoporosis Screening: covered every 2 years if you meet one of the following conditions: (1) You're estrogen deficient and at risk for osteoporosis based off medical history and other findings; (2) Have a vertebral abnormality; (3) On glucocorticoid therapy for more than 3 months; (4) Have primary hyperparathyroidism; (5) On osteoporosis medications and need to assess response to drug therapy.   Last bone density test (DXA Scan): 11/27/2023.  HIV Screening: covered annually if you're between the age of 15-65. Also covered annually if you are younger than 15 and older than 65 with risk factors for HIV infection. For pregnant patients, it is covered up to 3 times per pregnancy.    Immunizations:  Immunization Recommendations   Influenza Vaccine Annual  influenza vaccination during flu season is recommended for all persons aged >= 6 months who do not have contraindications   Pneumococcal Vaccine   * Pneumococcal conjugate vaccine = PCV13 (Prevnar 13), PCV15 (Vaxneuvance), PCV20 (Prevnar 20)  * Pneumococcal polysaccharide vaccine = PPSV23 (Pneumovax) Adults 19-65 yo with certain risk factors or if 65+ yo  If never received any pneumonia vaccine: recommend Prevnar 20 (PCV20)  Give PCV20 if previously received 1 dose of PCV13 or PPSV23   Hepatitis B Vaccine 3 dose series if at intermediate or high risk (ex: diabetes, end stage renal disease, liver disease)   Respiratory syncytial virus (RSV) Vaccine - COVERED BY MEDICARE PART D  * RSVPreF3 (Arexvy) CDC recommends that adults 60 years of age and older may receive a single dose of RSV vaccine using shared clinical decision-making (SCDM)   Tetanus (Td) Vaccine - COST NOT COVERED BY MEDICARE PART B Following completion of primary series, a booster dose should be given every 10 years to maintain immunity against tetanus. Td may also be given as tetanus wound prophylaxis.   Tdap Vaccine - COST NOT COVERED BY MEDICARE PART B Recommended at least once for all adults. For pregnant patients, recommended with each pregnancy.   Shingles Vaccine (Shingrix) - COST NOT COVERED BY MEDICARE PART B  2 shot series recommended in those 19 years and older who have or will have weakened immune systems or those 50 years and older     Health Maintenance Due:      Topic Date Due   • Breast Cancer Screening: Mammogram  06/22/2024   • Colorectal Cancer Screening  05/02/2029   • Hepatitis C Screening  Completed     Immunizations Due:      Topic Date Due   • Pneumococcal Vaccine: 65+ Years (2 of 2 - PPSV23 or PCV20) 05/03/2022   • Influenza Vaccine (1) 09/01/2024     Advance Directives   What are advance directives?  Advance directives are legal documents that state your wishes and plans for medical care. These plans are made ahead of time in  case you lose your ability to make decisions for yourself. Advance directives can apply to any medical decision, such as the treatments you want, and if you want to donate organs.   What are the types of advance directives?  There are many types of advance directives, and each state has rules about how to use them. You may choose a combination of any of the following:  Living will:  This is a written record of the treatment you want. You can also choose which treatments you do not want, which to limit, and which to stop at a certain time. This includes surgery, medicine, IV fluid, and tube feedings.   Durable power of  for healthcare (DPAHC):  This is a written record that states who you want to make healthcare choices for you when you are unable to make them for yourself. This person, called a proxy, is usually a family member or a friend. You may choose more than 1 proxy.  Do not resuscitate (DNR) order:  A DNR order is used in case your heart stops beating or you stop breathing. It is a request not to have certain forms of treatment, such as CPR. A DNR order may be included in other types of advance directives.  Medical directive:  This covers the care that you want if you are in a coma, near death, or unable to make decisions for yourself. You can list the treatments you want for each condition. Treatment may include pain medicine, surgery, blood transfusions, dialysis, IV or tube feedings, and a ventilator (breathing machine).  Values history:  This document has questions about your views, beliefs, and how you feel and think about life. This information can help others choose the care that you would choose.  Why are advance directives important?  An advance directive helps you control your care. Although spoken wishes may be used, it is better to have your wishes written down. Spoken wishes can be misunderstood, or not followed. Treatments may be given even if you do not want them. An advance directive  may make it easier for your family to make difficult choices about your care.   Weight Management   Why it is important to manage your weight:  Being overweight increases your risk of health conditions such as heart disease, high blood pressure, type 2 diabetes, and certain types of cancer. It can also increase your risk for osteoarthritis, sleep apnea, and other respiratory problems. Aim for a slow, steady weight loss. Even a small amount of weight loss can lower your risk of health problems.  How to lose weight safely:  A safe and healthy way to lose weight is to eat fewer calories and get regular exercise. You can lose up about 1 pound a week by decreasing the number of calories you eat by 500 calories each day.   Healthy meal plan for weight management:  A healthy meal plan includes a variety of foods, contains fewer calories, and helps you stay healthy. A healthy meal plan includes the following:  Eat whole-grain foods more often.  A healthy meal plan should contain fiber. Fiber is the part of grains, fruits, and vegetables that is not broken down by your body. Whole-grain foods are healthy and provide extra fiber in your diet. Some examples of whole-grain foods are whole-wheat breads and pastas, oatmeal, brown rice, and bulgur.  Eat a variety of vegetables every day.  Include dark, leafy greens such as spinach, kale, tara greens, and mustard greens. Eat yellow and orange vegetables such as carrots, sweet potatoes, and winter squash.   Eat a variety of fruits every day.  Choose fresh or canned fruit (canned in its own juice or light syrup) instead of juice. Fruit juice has very little or no fiber.  Eat low-fat dairy foods.  Drink fat-free (skim) milk or 1% milk. Eat fat-free yogurt and low-fat cottage cheese. Try low-fat cheeses such as mozzarella and other reduced-fat cheeses.  Choose meat and other protein foods that are low in fat.  Choose beans or other legumes such as split peas or lentils. Choose fish,  skinless poultry (chicken or turkey), or lean cuts of red meat (beef or pork). Before you cook meat or poultry, cut off any visible fat.   Use less fat and oil.  Try baking foods instead of frying them. Add less fat, such as margarine, sour cream, regular salad dressing and mayonnaise to foods. Eat fewer high-fat foods. Some examples of high-fat foods include french fries, doughnuts, ice cream, and cakes.  Eat fewer sweets.  Limit foods and drinks that are high in sugar. This includes candy, cookies, regular soda, and sweetened drinks.  Exercise:  Exercise at least 30 minutes per day on most days of the week. Some examples of exercise include walking, biking, dancing, and swimming. You can also fit in more physical activity by taking the stairs instead of the elevator or parking farther away from stores. Ask your healthcare provider about the best exercise plan for you.      © Copyright [employer] 2018 Information is for End User's use only and may not be sold, redistributed or otherwise used for commercial purposes. All illustrations and images included in CareNotes® are the copyrighted property of [employer] Inc[employer] or [employer]

## 2024-10-09 NOTE — ASSESSMENT & PLAN NOTE
Has an appointment for knee replacement consultation end of November.  Received hyaluronic acid injections this past spring which did not help.  Struggling to walk on anything but a flat surface.

## 2024-10-09 NOTE — PROGRESS NOTES
Ambulatory Visit  Name: Sailaja Stubbs      : 1955      MRN: [id number]  Encounter Provider: Raheem Nuñez MD  Encounter Date: 10/9/2024   Encounter department: [facility]    Assessment & Plan  Medicare annual wellness visit, subsequent         Dyslipidemia  Lab Results   Component Value Date    CHOLESTEROL 239 (H) 10/13/2023    TRIG 132 10/13/2023    HDL 57 10/13/2023    LDLCALC 156 (H) 10/13/2023     Cholesterol remains elevated.  Plan to repeat lipid panel.  If elevated can consider starting statin.  Orders:    Lipid panel; Future    Chronic pain of both knees  Has an appointment for knee replacement consultation end of November.  Received hyaluronic acid injections this past spring which did not help.  Struggling to walk on anything but a flat surface.       Class 1 obesity due to excess calories without serious comorbidity with body mass index (BMI) of 33.0 to 33.9 in adult      Orders:    Basic metabolic panel; Future    CBC; Future    Screening mammogram for breast cancer    Orders:    Mammo screening bilateral w 3d and cad; Future    Encounter for immunization    Orders:    influenza vaccine, high-dose, PF 0.5 mL (Fluzone High Dose)       AWV and follow-up completed today.  Reviewed previous labs.  Chronic conditions stable.  Continue current medications.  Preventive health issues were discussed with patient, and age appropriate screening tests were ordered as noted in patient's After Visit Summary. Personalized health advice and appropriate referrals for health education or preventive services given if needed, as noted in patient's After Visit Summary.    History of Present Illness     Patient presents with:  Medicare Wellness Visit and follow-up.  Following with orthopedic surgery currently.  She received hyaluronic acid injections this past May.  She continues to have pain in both knees.  Has an appointment scheduled with orthopedic surgery for knee replacement consultation.   Trouble walking downstairs and up hills.  Feels clicking in her knees.  She is due for repeat blood work.  Limited exercise due to knee pain.         Patient Care Team:  Raheem Nueñz MD as PCP - General (Family Medicine)    Review of Systems   Constitutional:  Negative for activity change, fatigue and fever.   Eyes:  Negative for visual disturbance.   Respiratory:  Negative for shortness of breath.    Cardiovascular:  Negative for chest pain.   Gastrointestinal:  Negative for abdominal pain, constipation, diarrhea and nausea.   Endocrine: Negative for cold intolerance and heat intolerance.   Musculoskeletal:  Negative for back pain.        Bilateral knee pain      Skin:  Negative for rash.   Neurological:  Negative for headaches.   Psychiatric/Behavioral:  Negative for confusion.      Medical History Reviewed by provider this encounter:  Tobacco  Allergies  Meds  Problems  Med Hx  Surg Hx  Fam Hx       Annual Wellness Visit Questionnaire   Sailaja is here for her Subsequent Wellness visit. Last Medicare Wellness visit information reviewed, patient interviewed and updates made to the record today.      Health Risk Assessment:   Patient rates overall health as very good. Patient feels that their physical health rating is same. Patient is very satisfied with their life. Eyesight was rated as same. Hearing was rated as same. Patient feels that their emotional and mental health rating is same. Patients states they are never, rarely angry. Patient states they are never, rarely unusually tired/fatigued. Pain experienced in the last 7 days has been some. Patient's pain rating has been 2/10. Patient states that she has experienced no weight loss or gain in last 6 months.     Depression Screening:   PHQ-2 Score: 0      Fall Risk Screening:   In the past year, patient has experienced: no history of falling in past year      Urinary Incontinence Screening:   Patient has not leaked urine accidently in the last six  months.     Home Safety:  Patient does not have trouble with stairs inside or outside of their home. Patient has working smoke alarms and has no working carbon monoxide detector. Home safety hazards include: none.     Nutrition:   Current diet is Regular.     Medications:   Patient is not currently taking any over-the-counter supplements. Patient is able to manage medications.     Activities of Daily Living (ADLs)/Instrumental Activities of Daily Living (IADLs):   Walk and transfer into and out of bed and chair?: Yes  Dress and groom yourself?: Yes    Bathe or shower yourself?: Yes    Feed yourself? Yes  Do your laundry/housekeeping?: Yes  Manage your money, pay your bills and track your expenses?: Yes  Make your own meals?: Yes    Do your own shopping?: Yes    Previous Hospitalizations:   Any hospitalizations or ED visits within the last 12 months?: No      Advance Care Planning:   Living will: Yes    Durable POA for healthcare: Yes    Advanced directive: Yes      Cognitive Screening:   Provider or family/friend/caregiver concerned regarding cognition?: No    PREVENTIVE SCREENINGS      Cardiovascular Screening:    General: Screening Current      Diabetes Screening:     General: Screening Current      Colorectal Cancer Screening:     General: Screening Current      Breast Cancer Screening:     General: Screening Current      Cervical Cancer Screening:    General: Screening Not Indicated      Osteoporosis Screening:    General: Screening Not Indicated and History Osteoporosis      Lung Cancer Screening:     General: Screening Not Indicated      Hepatitis C Screening:    General: Screening Current    Screening, Brief Intervention, and Referral to Treatment (SBIRT)    Screening  Typical number of drinks in a day: 0  Typical number of drinks in a week: 0  Interpretation: Low risk drinking behavior.    AUDIT-C Screenin) How often did you have a drink containing alcohol in the past year? monthly or less  2) How many  drinks did you have on a typical day when you were drinking in the past year? 0  3) How often did you have 6 or more drinks on one occasion in the past year? never    AUDIT-C Score: 1  Interpretation: Score 0-2 (female): Negative screen for alcohol misuse    Single Item Drug Screening:  How often have you used an illegal drug (including marijuana) or a prescription medication for non-medical reasons in the past year? never    Single Item Drug Screen Score: 0  Interpretation: Negative screen for possible drug use disorder    Brief Intervention  Alcohol & drug use screenings were reviewed. No concerns regarding substance use disorder identified.     Annual Depression Screening  Time spent screening and evaluating the patient for depression during today's encounter was 5 minutes.    Other Counseling Topics:   Car/seat belt/driving safety, skin self-exam, sunscreen and regular weightbearing exercise and calcium and vitamin D intake.     Social Determinants of Health     Financial Resource Strain: Low Risk  (9/27/2023)    Overall Financial Resource Strain (CARDIA)     Difficulty of Paying Living Expenses: Not hard at all   Food Insecurity: No Food Insecurity (10/9/2024)    Hunger Vital Sign     Worried About Running Out of Food in the Last Year: Never true     Ran Out of Food in the Last Year: Never true   Transportation Needs: No Transportation Needs (10/9/2024)    PRAPARE - Transportation     Lack of Transportation (Medical): No     Lack of Transportation (Non-Medical): No   Housing Stability: Low Risk  (10/9/2024)    Housing Stability Vital Sign     Unable to Pay for Housing in the Last Year: No     Number of Times Moved in the Last Year: 0     Homeless in the Last Year: No   Utilities: Not At Risk (10/9/2024)    [facility] Utilities     Threatened with loss of utilities: No     No results found.    Objective     /72 (BP Location: Left arm, Patient Position: Sitting, Cuff Size: Standard)   Pulse 61   Temp 97.8 °F  "(36.6 °C) (Temporal)   Resp 18   Ht 5' 3.5\" (1.613 m)   Wt 89.4 kg (197 lb)   SpO2 98%   BMI 34.35 kg/m²     Physical Exam  Vitals and nursing note reviewed.   Constitutional:       Appearance: Normal appearance. She is well-developed.   HENT:      Head: Normocephalic and atraumatic.   Cardiovascular:      Rate and Rhythm: Normal rate and regular rhythm.   Pulmonary:      Effort: Pulmonary effort is normal.      Breath sounds: Normal breath sounds.   Abdominal:      General: Bowel sounds are normal.      Palpations: Abdomen is soft.   Musculoskeletal:         General: Tenderness present.      Cervical back: Normal range of motion.      Right knee: Bony tenderness and crepitus present.      Left knee: Bony tenderness and crepitus present.   Skin:     General: Skin is warm.   Neurological:      General: No focal deficit present.      Mental Status: She is alert.   Psychiatric:         Mood and Affect: Mood normal.         Speech: Speech normal.         "

## 2024-10-10 ENCOUNTER — APPOINTMENT (OUTPATIENT)
Dept: LAB | Facility: CLINIC | Age: 69
End: 2024-10-10
Payer: MEDICARE

## 2024-10-10 DIAGNOSIS — Z00.6 ENCOUNTER FOR EXAMINATION FOR NORMAL COMPARISON OR CONTROL IN CLINICAL RESEARCH PROGRAM: ICD-10-CM

## 2024-10-10 DIAGNOSIS — E78.5 DYSLIPIDEMIA: ICD-10-CM

## 2024-10-10 DIAGNOSIS — E66.811 CLASS 1 OBESITY DUE TO EXCESS CALORIES WITHOUT SERIOUS COMORBIDITY WITH BODY MASS INDEX (BMI) OF 33.0 TO 33.9 IN ADULT: ICD-10-CM

## 2024-10-10 DIAGNOSIS — E66.09 CLASS 1 OBESITY DUE TO EXCESS CALORIES WITHOUT SERIOUS COMORBIDITY WITH BODY MASS INDEX (BMI) OF 33.0 TO 33.9 IN ADULT: ICD-10-CM

## 2024-10-10 LAB
ANION GAP SERPL CALCULATED.3IONS-SCNC: 7 MMOL/L (ref 4–13)
BUN SERPL-MCNC: 12 MG/DL (ref 5–25)
CALCIUM SERPL-MCNC: 9.1 MG/DL (ref 8.4–10.2)
CHLORIDE SERPL-SCNC: 105 MMOL/L (ref 96–108)
CHOLEST SERPL-MCNC: 224 MG/DL
CO2 SERPL-SCNC: 28 MMOL/L (ref 21–32)
CREAT SERPL-MCNC: 0.86 MG/DL (ref 0.6–1.3)
ERYTHROCYTE [DISTWIDTH] IN BLOOD BY AUTOMATED COUNT: 14.4 % (ref 11.6–15.1)
GFR SERPL CREATININE-BSD FRML MDRD: 68 ML/MIN/1.73SQ M
GLUCOSE P FAST SERPL-MCNC: 97 MG/DL (ref 65–99)
HCT VFR BLD AUTO: 43.4 % (ref 34.8–46.1)
HDLC SERPL-MCNC: 46 MG/DL
HGB BLD-MCNC: 14 G/DL (ref 11.5–15.4)
LDLC SERPL CALC-MCNC: 146 MG/DL (ref 0–100)
MCH RBC QN AUTO: 30.8 PG (ref 26.8–34.3)
MCHC RBC AUTO-ENTMCNC: 32.3 G/DL (ref 31.4–37.4)
MCV RBC AUTO: 95 FL (ref 82–98)
NONHDLC SERPL-MCNC: 178 MG/DL
PLATELET # BLD AUTO: 318 THOUSANDS/UL (ref 149–390)
PMV BLD AUTO: 11.2 FL (ref 8.9–12.7)
POTASSIUM SERPL-SCNC: 4.5 MMOL/L (ref 3.5–5.3)
RBC # BLD AUTO: 4.55 MILLION/UL (ref 3.81–5.12)
SODIUM SERPL-SCNC: 140 MMOL/L (ref 135–147)
TRIGL SERPL-MCNC: 160 MG/DL
WBC # BLD AUTO: 6.04 THOUSAND/UL (ref 4.31–10.16)

## 2024-10-10 PROCEDURE — 80061 LIPID PANEL: CPT

## 2024-10-10 PROCEDURE — 36415 COLL VENOUS BLD VENIPUNCTURE: CPT

## 2024-10-10 PROCEDURE — 80048 BASIC METABOLIC PNL TOTAL CA: CPT

## 2024-10-10 PROCEDURE — 85027 COMPLETE CBC AUTOMATED: CPT

## 2024-10-20 LAB
APOB+LDLR+PCSK9 GENE MUT ANL BLD/T: NOT DETECTED
BRCA1+BRCA2 DEL+DUP + FULL MUT ANL BLD/T: NOT DETECTED
MLH1+MSH2+MSH6+PMS2 GN DEL+DUP+FUL M: NOT DETECTED

## 2024-11-11 ENCOUNTER — HOSPITAL ENCOUNTER (OUTPATIENT)
Dept: RADIOLOGY | Facility: MEDICAL CENTER | Age: 69
Discharge: HOME | End: 2024-11-11
Payer: MEDICARE

## 2024-11-11 VITALS — HEIGHT: 63 IN | BODY MASS INDEX: 34.91 KG/M2 | WEIGHT: 197 LBS

## 2024-11-11 DIAGNOSIS — Z12.31 SCREENING MAMMOGRAM FOR BREAST CANCER: ICD-10-CM

## 2024-11-11 PROCEDURE — 77067 SCR MAMMO BI INCL CAD: CPT

## 2024-11-11 PROCEDURE — 77063 BREAST TOMOSYNTHESIS BI: CPT

## 2024-12-17 ENCOUNTER — TRANSCRIBE ORDERS (OUTPATIENT)
Dept: PHYSICAL THERAPY | Facility: CLINIC | Age: 69
End: 2024-12-17

## 2024-12-17 ENCOUNTER — EVALUATION (OUTPATIENT)
Dept: PHYSICAL THERAPY | Facility: CLINIC | Age: 69
End: 2024-12-17
Payer: MEDICARE

## 2024-12-17 DIAGNOSIS — G89.29 CHRONIC PAIN OF LEFT KNEE: Primary | ICD-10-CM

## 2024-12-17 DIAGNOSIS — M25.562 CHRONIC PAIN OF LEFT KNEE: Primary | ICD-10-CM

## 2024-12-17 DIAGNOSIS — Z01.818 OTHER SPECIFIED PRE-OPERATIVE EXAMINATION: ICD-10-CM

## 2024-12-17 DIAGNOSIS — Z96.652 TOTAL KNEE REPLACEMENT STATUS, LEFT: ICD-10-CM

## 2024-12-17 DIAGNOSIS — Z01.818 PRE-OP EXAM: ICD-10-CM

## 2024-12-17 DIAGNOSIS — Z01.812 PRE-OPERATIVE LABORATORY EXAMINATION: ICD-10-CM

## 2024-12-17 PROCEDURE — 97161 PT EVAL LOW COMPLEX 20 MIN: CPT | Performed by: PHYSICAL THERAPIST

## 2024-12-17 PROCEDURE — 97112 NEUROMUSCULAR REEDUCATION: CPT | Performed by: PHYSICAL THERAPIST

## 2024-12-17 NOTE — LETTER
2024    Van Turner MD  [address] [place] [id number]    Patient: Sailaja Stubbs   YOB: 1955   Date of Visit: 2024     Encounter Diagnosis     ICD-10-CM    1. Chronic pain of left knee  M25.562     G89.29       2. Pre-op exam  Z01.818       3. Total knee replacement status, left  Z96.652           Dear Dr. Turner:    Thank you for your recent referral of Sailaja Stubbs. Please review the attached evaluation summary from Sailaja's recent visit.     Please verify that you agree with the plan of care by signing the attached order.     If you have any questions or concerns, please do not hesitate to call.     I sincerely appreciate the opportunity to share in the care of one of your patients and hope to have another opportunity to work with you in the near future.       Sincerely,    Dian Pagan, PT      Referring Provider:      I certify that I have read the below Plan of Care and certify the need for these services furnished under this plan of treatment while under my care.                    Van Turner MD  [address] [facility] [address]  Via Fax: [phone]          PT EVALUATION/PRE-OP    Today's date: 24  Patient name: Sailaja Stubbs  : 1955  MRN: [id number]  Referring provider: No ref. provider found  Dx:   1. Chronic pain of left knee    2. Pre-op exam    3. Total knee replacement status, left          ASSESSMENT:   Saialja Stubbs is a 69 y.o. female who presents with signs and symptoms consistent of chronic Left knee pain and is here this date for TKA pre-op. Patient has surgery scheduled for 25 with Dr. Austyn Turner of [facility]. Patient presents with pain, decreased strength, decreased ROM, decreased joint mobility, and ambulatory dysfunction. Reviewed virtual home assessment and home exercise program. Patient demonstrated understanding and had no further questions. Due to these impairments, Patient has difficulty performing a/iadls  and recreational activities. Patient would benefit from skilled physical therapy to address the impairments, improve their level of function, and to improve their overall quality of life.        Impairments:    restricted ROM    decreased strength   pain with function   activity intolerance   weight bearing intolerance   abnormal gait     Prognosis:  Good  Positive and negative prognostic indicator(s):  pain >3 months and multiple comorbidities    Goals: FOR POST-OP (PRE-OP ONLY THIS DATE)    Short Term Goals: to be achieved by 4 weeks  1) Patient to be independent with basic HEP.  2) Decrease pain to 5/10 at its worst.  3) Increase knee ROM by 5-10 degrees   4) Increase LE strength by 1/2 MMT grade in all deficient planes.    Long Term Goals: to be achieved by discharge  1) FOTO equal to or greater than target score indicating improvements with overall function.  2) Ambulation to improve to maximal level of function  3) Stair negotiation will improve to reciprocal.  4) Sit to stand transfers will improve to maximal level of function       Planned interventions:  home exercise program, patient education, manual therapy, graded activity, flexibility, functional range of motion exercises, strengthening, abdominal trunk stabilization, balance and weight bearing training, gait training, and modalities prn    Duration in visits:  8-12  Frequency: 2 visits per week  Duration in weeks:  6-8    History of Current Injury: Patient notes that she has Left TKA scheduled on 1/28/25 with Dr. Turner through [facility]. Patient notes that her left knee has been bothering her on and off for the last few years. Patient notes that most of the time she just leo with the pain and will take Advil as needed. Patient notes that she likes to go for walks and can still do that but it will bother her.     Pain location: global left knee       Aggravating factors: walking prolong, steps       Imaging: see imaging tab   Special Questions: Has BLE  edema from varicose veins. Denies numbness of tingling in the legs and feet.     HOME SET-UP  Social support: lives with  that will be able to help.   Home Type: 2 story home  MAYKEL: through garage you walk in on ground level and go up full flight of stairs to main level. Wide stair case with one railing.   Steps Inside: full flight of stairs to master bedroom. There is a spare room on first floor with bathroom that she will be able to use.   Shower: full bathroom on first floor with walk in shower. Grab bars and shower bench.   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   Patient goals: Patient reports goals for physical therapy would be decreased pain, increased mobility, increased strength, and return to recreation        Objective     Active Range of Motion   Left Knee   Flexion: 125 degrees   Extension: -2 degrees     Strength/Myotome Testing     Left Knee   Flexion: 4+  Extension: 4+  Quadriceps contraction: good    Ambulation     Ambulation: Level Surfaces   Ambulation without assistive device: independent    Observational Gait   Gait: within functional limits   Walking speed, stride length, left stance time, right stance time, left swing time, right swing time, left step length and right step length within functional limits.   Left foot contact pattern: heel to toe  Right foot contact pattern: heel to toe  Left arm swing: within functional limits  Right arm swing: within functional limits  Base of support: normal    Comments   TU seconds without AD and no UE for sit<>Stand     Functional Assessment        Comments  Functional sit <>stand: able to perform without UE support   5xSTS: 14 seconds           Precautions: hearing loss (increased       Manuals              Manual knee stretching (FLEX AND EXT)             Patellar mobs              tibiofemoral joint mobs                          Neuro Re-Ed             Quad set with strap              Weight shifts LAT             Staggered Weight Shifts              Supine knee extension stretch with weight              TKE             SLS                          Ther Ex             SAQ             LAQ             3-way hip              Heel slides supine or seated              Seated static hold in flexion             Stool extension stretch with over pressure              Heel raises              Standing marches              Standing hamstring curls                                        NuStep-->BIke             Ther Activity             Sit to stands             Mini squats              Step ups              Step downs             Stairs             Gait Training             Hurdles                           Modalities

## 2024-12-17 NOTE — PROGRESS NOTES
PT EVALUATION/PRE-OP    Today's date: 24  Patient name: Sailaja Stubbs  : 1955  MRN: [id number]  Referring provider: No ref. provider found  Dx:   1. Chronic pain of left knee    2. Pre-op exam    3. Total knee replacement status, left          ASSESSMENT:   Sailaja Stubbs is a 69 y.o. female who presents with signs and symptoms consistent of chronic Left knee pain and is here this date for TKA pre-op. Patient has surgery scheduled for 25 with Dr. Austyn Turner of [facility]. Patient presents with pain, decreased strength, decreased ROM, decreased joint mobility, and ambulatory dysfunction. Reviewed virtual home assessment and home exercise program. Patient demonstrated understanding and had no further questions. Due to these impairments, Patient has difficulty performing a/iadls and recreational activities. Patient would benefit from skilled physical therapy to address the impairments, improve their level of function, and to improve their overall quality of life.        Impairments:    restricted ROM    decreased strength   pain with function   activity intolerance   weight bearing intolerance   abnormal gait     Prognosis:  Good  Positive and negative prognostic indicator(s):  pain >3 months and multiple comorbidities    Goals: FOR POST-OP (PRE-OP ONLY THIS DATE)    Short Term Goals: to be achieved by 4 weeks  1) Patient to be independent with basic HEP.  2) Decrease pain to 5/10 at its worst.  3) Increase knee ROM by 5-10 degrees   4) Increase LE strength by 1/2 MMT grade in all deficient planes.    Long Term Goals: to be achieved by discharge  1) FOTO equal to or greater than target score indicating improvements with overall function.  2) Ambulation to improve to maximal level of function  3) Stair negotiation will improve to reciprocal.  4) Sit to stand transfers will improve to maximal level of function       Planned interventions:  home exercise program, patient education, manual therapy, graded activity,  flexibility, functional range of motion exercises, strengthening, abdominal trunk stabilization, balance and weight bearing training, gait training, and modalities prn    Duration in visits:  8-12  Frequency: 2 visits per week  Duration in weeks:  6-8    History of Current Injury: Patient notes that she has Left TKA scheduled on 1/28/25 with Dr. Turner through [facility]. Patient notes that her left knee has been bothering her on and off for the last few years. Patient notes that most of the time she just leo with the pain and will take Advil as needed. Patient notes that she likes to go for walks and can still do that but it will bother her.     Pain location: global left knee       Aggravating factors: walking prolong, steps       Imaging: see imaging tab   Special Questions: Has BLE edema from varicose veins. Denies numbness of tingling in the legs and feet.     HOME SET-UP  Social support: lives with  that will be able to help.   Home Type: 2 story home  MAYKEL: through garage you walk in on ground level and go up full flight of stairs to main level. Wide stair case with one railing.   Steps Inside: full flight of stairs to master bedroom. There is a spare room on first floor with bathroom that she will be able to use.   Shower: full bathroom on first floor with walk in shower. Grab bars and shower bench.   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   Patient goals: Patient reports goals for physical therapy would be decreased pain, increased mobility, increased strength, and return to recreation        Objective     Active Range of Motion   Left Knee   Flexion: 125 degrees   Extension: -2 degrees     Strength/Myotome Testing     Left Knee   Flexion: 4+  Extension: 4+  Quadriceps  contraction: good    Ambulation     Ambulation: Level Surfaces   Ambulation without assistive device: independent    Observational Gait   Gait: within functional limits   Walking speed, stride length, left stance time, right stance time, left swing time, right swing time, left step length and right step length within functional limits.   Left foot contact pattern: heel to toe  Right foot contact pattern: heel to toe  Left arm swing: within functional limits  Right arm swing: within functional limits  Base of support: normal    Comments   TU seconds without AD and no UE for sit<>Stand     Functional Assessment        Comments  Functional sit <>stand: able to perform without UE support   5xSTS: 14 seconds           Precautions: hearing loss (increased       Manuals             Manual knee stretching (FLEX AND EXT)             Patellar mobs              tibiofemoral joint mobs                          Neuro Re-Ed             Quad set with strap              Weight shifts LAT             Staggered Weight Shifts              Supine knee extension stretch with weight              TKE             SLS                          Ther Ex             SAQ             LAQ             3-way hip              Heel slides supine or seated              Seated static hold in flexion             Stool extension stretch with over pressure              Heel raises              Standing marches              Standing hamstring curls                                        NuStep-->BIke             Ther Activity             Sit to stands             Mini squats              Step ups              Step downs             Stairs             Gait Training             Hurdles                           Modalities

## 2024-12-19 ENCOUNTER — APPOINTMENT (OUTPATIENT)
Dept: PHYSICAL THERAPY | Facility: CLINIC | Age: 69
End: 2024-12-19
Payer: MEDICARE

## 2024-12-20 ENCOUNTER — RA CDI HCC (OUTPATIENT)
Dept: OTHER | Facility: HOSPITAL | Age: 69
End: 2024-12-20

## 2024-12-31 ENCOUNTER — CONSULT (OUTPATIENT)
Dept: FAMILY MEDICINE CLINIC | Facility: CLINIC | Age: 69
End: 2024-12-31
Payer: MEDICARE

## 2024-12-31 VITALS
RESPIRATION RATE: 16 BRPM | BODY MASS INDEX: 36.14 KG/M2 | HEART RATE: 69 BPM | SYSTOLIC BLOOD PRESSURE: 138 MMHG | TEMPERATURE: 98.6 F | OXYGEN SATURATION: 97 % | WEIGHT: 204 LBS | DIASTOLIC BLOOD PRESSURE: 90 MMHG | HEIGHT: 63 IN

## 2024-12-31 DIAGNOSIS — M17.12 PRIMARY OSTEOARTHRITIS OF LEFT KNEE: ICD-10-CM

## 2024-12-31 DIAGNOSIS — Z01.818 PRE-OP EXAMINATION: Primary | ICD-10-CM

## 2024-12-31 PROCEDURE — 93000 ELECTROCARDIOGRAM COMPLETE: CPT | Performed by: FAMILY MEDICINE

## 2024-12-31 PROCEDURE — 99214 OFFICE O/P EST MOD 30 MIN: CPT | Performed by: FAMILY MEDICINE

## 2024-12-31 RX ORDER — MUPIROCIN 20 MG/G
OINTMENT TOPICAL
COMMUNITY
Start: 2024-11-25

## 2024-12-31 NOTE — PROGRESS NOTES
Pre-operative Clearance  Name: Sailaja Stubbs      : 1955      MRN: [id number]  Encounter Provider: Raheem Nuñez MD  Encounter Date: 2024   Encounter department: [facility]    Assessment & Plan  Pre-op examination         Primary osteoarthritis of left knee         EKG completed in office today.  Obtain remainder of blood work ordered through orthopedic surgery.    Pre-operative Clearance:     Revised Cardiac Risk Index:  RCI RISK CLASS I (0 risk factors, risk of major cardiac complications approximately 0.5%)    Clearance:  Patient is medically optimized (CLEARED) for proposed surgery without any additional cardiac testing.      Medication Instructions:   - Avoid herbs or non-directed vitamins one week prior to surgery    - Avoid aspirin containing medications or non-steroidal anti-inflammatory drugs one week preceding surgery         History of Present Illness     Pre-op Exam  Surgery: Left knee total arthroplasty  Anticipated Date of Surgery: 2025  Surgeon: Dr. Turner    Previous history of bleeding disorders or clots?: No  Previous Anesthesia reaction?: No  Prolonged steroid use in the last 6 months?: No    Assessment of Cardiac Risk:   - Unstable or severe angina or MI in the last 6 weeks or history of stent placement in the last year?: No   - Decompensated heart failure (e.g. New onset heart failure, NYHA  Class IV heart failure, or worsening existing heart failure)?: No  - Significant arrhythmias such as high grade AV block, symptomatic ventricular arrhythmia, newly recognized ventricular tachycardia, supraventricular tachycardia with resting heart rate >100, or symptomatic bradycardia?: No  - Severe heart valve disease including aortic stenosis or symptomatic mitral stenosis?: No      Pre-operative Risk Factors:  Elevated-risk surgery: No    History of cerebrovascular disease: No    History of ischemic heart disease: No  Pre-operative treatment with insulin:  No  Pre-operative creatinine >2 mg/dL: No    History of congestive heart failure: No    Review of Systems   Constitutional:  Negative for activity change, fatigue and fever.   Eyes:  Negative for visual disturbance.   Respiratory:  Negative for shortness of breath.    Cardiovascular:  Negative for chest pain.   Gastrointestinal:  Negative for abdominal pain, constipation, diarrhea and nausea.   Endocrine: Negative for cold intolerance and heat intolerance.   Musculoskeletal:  Negative for back pain.        Left knee pain   Skin:  Negative for rash.   Neurological:  Negative for headaches.   Psychiatric/Behavioral:  Negative for confusion.      Past Medical History   Past Medical History:   Diagnosis Date    Arthritis     Colon polyp     HL (hearing loss) 2007    Vertigo 05/2019     Past Surgical History:   Procedure Laterality Date    CHOLECYSTECTOMY      COLONOSCOPY      HYSTERECTOMY Bilateral     age 41     Family History   Problem Relation Age of Onset    Prostate cancer Father 62    Stroke Father     Hypertension Father     Hyperlipidemia Father     Hearing loss Father     Stomach cancer Maternal Grandfather 60    Stomach cancer Paternal Grandmother 80    Cancer Paternal Grandfather 50    Colon cancer Maternal Aunt 50    Vision loss Mother     Atrial fibrillation Mother     Glaucoma Mother     Macular degeneration Mother     Dementia Mother     Hypertension Mother     Hyperlipidemia Mother     Hearing loss Mother     No Known Problems Sister     No Known Problems Daughter     No Known Problems Maternal Grandmother     No Known Problems Sister     No Known Problems Son     No Known Problems Daughter     No Known Problems Daughter     No Known Problems Paternal Aunt      Social History     Tobacco Use    Smoking status: Never    Smokeless tobacco: Never   Vaping Use    Vaping status: Never Used   Substance and Sexual Activity    Alcohol use: Yes     Alcohol/week: 1.0 standard drink of alcohol     Types: 1 Glasses  "of wine per week     Comment: very rarely    Drug use: Never    Sexual activity: Yes     Partners: Male     Birth control/protection: Female Sterilization     Comment: Hysterectomy     Current Outpatient Medications on File Prior to Visit   Medication Sig    Cholecalciferol (VITAMIN D3 PO) Take 1 tablet by mouth in the morning    multivitamin (THERAGRAN) TABS Take 1 tablet by mouth daily    mupirocin (BACTROBAN) 2 % ointment APPLY TWICE A DAY TO EACH NOSTRIL STARTING FIVE DAYS BEFORE SURGERY (Patient not taking: Reported on 12/31/2024)     No Known Allergies  Objective   /90 (BP Location: Left arm, Patient Position: Sitting, Cuff Size: Large)   Pulse 69   Temp 98.6 °F (37 °C) (Temporal)   Resp 16   Ht 5' 3\" (1.6 m)   Wt 92.5 kg (204 lb)   SpO2 97%   BMI 36.14 kg/m²     Physical Exam  Vitals and nursing note reviewed.   Constitutional:       Appearance: Normal appearance. She is well-developed.   HENT:      Head: Normocephalic and atraumatic.   Cardiovascular:      Rate and Rhythm: Normal rate and regular rhythm.   Pulmonary:      Effort: Pulmonary effort is normal.      Breath sounds: Normal breath sounds.   Abdominal:      General: Bowel sounds are normal.      Palpations: Abdomen is soft.   Musculoskeletal:         General: Tenderness present.      Cervical back: Normal range of motion.   Skin:     General: Skin is warm.   Neurological:      General: No focal deficit present.      Mental Status: She is alert.   Psychiatric:         Mood and Affect: Mood normal.         Speech: Speech normal.           Raheem Nuñez MD  "

## 2025-01-02 ENCOUNTER — APPOINTMENT (OUTPATIENT)
Dept: LAB | Facility: CLINIC | Age: 70
End: 2025-01-02
Payer: MEDICARE

## 2025-01-02 DIAGNOSIS — G89.29 CHRONIC PAIN OF LEFT KNEE: ICD-10-CM

## 2025-01-02 DIAGNOSIS — Z96.652 TOTAL KNEE REPLACEMENT STATUS, LEFT: ICD-10-CM

## 2025-01-02 DIAGNOSIS — Z01.812 PRE-OPERATIVE LABORATORY EXAMINATION: ICD-10-CM

## 2025-01-02 DIAGNOSIS — Z01.818 PRE-OP EXAM: ICD-10-CM

## 2025-01-02 DIAGNOSIS — M25.562 CHRONIC PAIN OF LEFT KNEE: ICD-10-CM

## 2025-01-02 DIAGNOSIS — Z01.818 OTHER SPECIFIED PRE-OPERATIVE EXAMINATION: ICD-10-CM

## 2025-01-02 LAB
ALBUMIN SERPL BCG-MCNC: 3.9 G/DL (ref 3.5–5)
ALP SERPL-CCNC: 61 U/L (ref 34–104)
ALT SERPL W P-5'-P-CCNC: 29 U/L (ref 7–52)
AMORPH URATE CRY URNS QL MICRO: NORMAL
ANION GAP SERPL CALCULATED.3IONS-SCNC: 6 MMOL/L (ref 4–13)
AST SERPL W P-5'-P-CCNC: 20 U/L (ref 13–39)
BACTERIA UR QL AUTO: NORMAL /HPF
BASOPHILS # BLD AUTO: 0.04 THOUSANDS/ΜL (ref 0–0.1)
BASOPHILS NFR BLD AUTO: 1 % (ref 0–1)
BILIRUB SERPL-MCNC: 0.58 MG/DL (ref 0.2–1)
BILIRUB UR QL STRIP: NEGATIVE
BUN SERPL-MCNC: 14 MG/DL (ref 5–25)
CALCIUM SERPL-MCNC: 9.1 MG/DL (ref 8.4–10.2)
CHLORIDE SERPL-SCNC: 105 MMOL/L (ref 96–108)
CLARITY UR: ABNORMAL
CO2 SERPL-SCNC: 29 MMOL/L (ref 21–32)
COLOR UR: ABNORMAL
CREAT SERPL-MCNC: 0.97 MG/DL (ref 0.6–1.3)
EOSINOPHIL # BLD AUTO: 0.28 THOUSAND/ΜL (ref 0–0.61)
EOSINOPHIL NFR BLD AUTO: 5 % (ref 0–6)
ERYTHROCYTE [DISTWIDTH] IN BLOOD BY AUTOMATED COUNT: 13.1 % (ref 11.6–15.1)
GFR SERPL CREATININE-BSD FRML MDRD: 59 ML/MIN/1.73SQ M
GLUCOSE P FAST SERPL-MCNC: 111 MG/DL (ref 65–99)
GLUCOSE UR STRIP-MCNC: NEGATIVE MG/DL
HCT VFR BLD AUTO: 42.5 % (ref 34.8–46.1)
HGB BLD-MCNC: 13.7 G/DL (ref 11.5–15.4)
HGB UR QL STRIP.AUTO: NEGATIVE
IMM GRANULOCYTES # BLD AUTO: 0.02 THOUSAND/UL (ref 0–0.2)
IMM GRANULOCYTES NFR BLD AUTO: 0 % (ref 0–2)
KETONES UR STRIP-MCNC: NEGATIVE MG/DL
LEUKOCYTE ESTERASE UR QL STRIP: NEGATIVE
LYMPHOCYTES # BLD AUTO: 1.89 THOUSANDS/ΜL (ref 0.6–4.47)
LYMPHOCYTES NFR BLD AUTO: 31 % (ref 14–44)
MCH RBC QN AUTO: 31.1 PG (ref 26.8–34.3)
MCHC RBC AUTO-ENTMCNC: 32.2 G/DL (ref 31.4–37.4)
MCV RBC AUTO: 97 FL (ref 82–98)
MONOCYTES # BLD AUTO: 0.4 THOUSAND/ΜL (ref 0.17–1.22)
MONOCYTES NFR BLD AUTO: 7 % (ref 4–12)
NEUTROPHILS # BLD AUTO: 3.49 THOUSANDS/ΜL (ref 1.85–7.62)
NEUTS SEG NFR BLD AUTO: 56 % (ref 43–75)
NITRITE UR QL STRIP: NEGATIVE
NON-SQ EPI CELLS URNS QL MICRO: NORMAL /HPF
NRBC BLD AUTO-RTO: 0 /100 WBCS
PH UR STRIP.AUTO: 7.5 [PH]
PLATELET # BLD AUTO: 345 THOUSANDS/UL (ref 149–390)
PMV BLD AUTO: 10.5 FL (ref 8.9–12.7)
POTASSIUM SERPL-SCNC: 5 MMOL/L (ref 3.5–5.3)
PROT SERPL-MCNC: 6.8 G/DL (ref 6.4–8.4)
PROT UR STRIP-MCNC: ABNORMAL MG/DL
RBC # BLD AUTO: 4.4 MILLION/UL (ref 3.81–5.12)
RBC #/AREA URNS AUTO: NORMAL /HPF
SODIUM SERPL-SCNC: 140 MMOL/L (ref 135–147)
SP GR UR STRIP.AUTO: 1.02 (ref 1–1.03)
UROBILINOGEN UR STRIP-ACNC: <2 MG/DL
WBC # BLD AUTO: 6.12 THOUSAND/UL (ref 4.31–10.16)
WBC #/AREA URNS AUTO: NORMAL /HPF

## 2025-01-02 PROCEDURE — 81001 URINALYSIS AUTO W/SCOPE: CPT

## 2025-01-02 PROCEDURE — 36415 COLL VENOUS BLD VENIPUNCTURE: CPT

## 2025-01-02 PROCEDURE — 80053 COMPREHEN METABOLIC PANEL: CPT

## 2025-01-02 PROCEDURE — 85025 COMPLETE CBC W/AUTO DIFF WBC: CPT

## 2025-01-31 ENCOUNTER — OFFICE VISIT (OUTPATIENT)
Dept: PHYSICAL THERAPY | Facility: CLINIC | Age: 70
End: 2025-01-31
Payer: MEDICARE

## 2025-01-31 DIAGNOSIS — Z96.652 TOTAL KNEE REPLACEMENT STATUS, LEFT: Primary | ICD-10-CM

## 2025-01-31 DIAGNOSIS — M25.562 CHRONIC PAIN OF LEFT KNEE: ICD-10-CM

## 2025-01-31 DIAGNOSIS — G89.29 CHRONIC PAIN OF LEFT KNEE: ICD-10-CM

## 2025-01-31 PROCEDURE — 97162 PT EVAL MOD COMPLEX 30 MIN: CPT | Performed by: PHYSICAL THERAPIST

## 2025-01-31 PROCEDURE — 97112 NEUROMUSCULAR REEDUCATION: CPT | Performed by: PHYSICAL THERAPIST

## 2025-01-31 NOTE — PROGRESS NOTES
PT EVALUATION/1st POST-OP    Today's date: 25  Patient name: Sailaja Stubbs  : 1955  MRN: [id number]  Referring provider: Van Turner MD  Dx:   1. Total knee replacement status, left    2. Chronic pain of left knee            ASSESSMENT:   Sailaja Stubbs is a 69 y.o. female who is s/p Left TKA due to chronic pain.  Surgery was performed on 25 with Dr. Turner of [facility]. Patient presents ambulating with RW WBAT. Patient notes that pain is well controlled. Incision is intact and does not show signs of infection. Patient is neurological intact and no signs of DVT based on Wells Criteria. Overall, patient presents with pain, decreased strength, decreased ROM, and decreased joint mobility. Patient demonstrating normal post operative objective measurements based on post-op status. Due to these impairments, Patient has difficulty performing a/iadls and recreational activities. Patient would benefit from skilled physical therapy to address the impairments, improve their level of function, and to improve their overall quality of life.      Impairments:    restricted ROM    decreased strength   pain with function   activity intolerance   weight bearing intolerance   abnormal gait     Prognosis:  Good  Positive and negative prognostic indicator(s):  pain >3 months and multiple comorbidities    Goals:     Short Term Goals: to be achieved by 4 weeks  1) Patient to be independent with basic HEP.  2) Decrease pain to 5/10 at its worst.  3) Increase knee ROM by 5-10 degrees   4) Increase LE strength by 1/2 MMT grade in all deficient planes.    Long Term Goals: to be achieved by discharge  1) FOTO equal to or greater than target score indicating improvements with overall function.  2) Ambulation to improve to maximal level of function  3) Stair negotiation will improve to reciprocal.  4) Sit to stand transfers will improve to maximal level of function       Planned interventions:  home exercise program, patient education,  manual therapy, graded activity, flexibility, functional range of motion exercises, strengthening, abdominal trunk stabilization, balance and weight bearing training, gait training, and modalities prn    Duration in visits:  8-12  Frequency: 2 visits per week  Duration in weeks:  6-8    History of Current Injury: Patient notes that she had surgery on 1/28 with Dr. Turner. Patient notes that surgery went with no complications. Patient notes that she I getting around at home well. Patient notes that she is staying on the first floor so only has to do one flight of stairs. Patient notes that she did well coming down today for this appointment. Patient note that pain is well controlled with tylenol and has yet to take the Oxy. Patient notes that the first 1-2 she still had the nerve block so she felt really weak but that is improving. Patient notes that sleeping is going well. Patient notes that she has the bike at home that she is using everyday.     Pain location: joint line global   Pain description: dull ache   Pain at worst: 5-6/10   Pain at best: 1-2/10       Aggravating factors: after using the bike   Easing factors: game ready at home for icing, rest     Imaging: see imaging tab   Special Questions: Has BLE edema from varicose veins. Denies numbness of tingling in the legs and feet.     HOME SET-UP  Social support: lives with  that will be able to help.   Home Type: 2 story home  MAYKEL: through garage you walk in on ground level and go up full flight of stairs to main level. Wide stair case with one railing.   Steps Inside: full flight of stairs to master bedroom. There is a spare room on first floor with bathroom that she will be able to use.   Shower: full bathroom on first floor with walk in shower. Grab bars and shower bench.   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   Patient goals: Patient reports goals for physical therapy would be decreased pain, increased mobility, increased strength, and return to recreation  return to recreational walking program       Objective     Observations   Left Knee   Positive for edema, effusion and incision.     Additional Observation Details  Incision intact with steri-strips  Patient donned in compression stockings.   No active drainage, some dried blood.   No signs infection.   Mild redness, bruising, and swelling globally.     Tenderness   Left Knee   Tenderness in the lateral joint line and medial joint line.     Neurological Testing     Sensation     Knee   Left Knee   Intact: Light touch    Right Knee   Intact: light touch     Active Range of Motion   Left Knee   Flexion: 87 degrees with pain  Extension: -10 degrees with pain    Mobility   Patellar Mobility:   Left Knee   Hypomobile: left medial, left lateral, left superior and left inferior    Strength/Myotome Testing     Left Knee   Flexion: 3  Extension: 3  Quadriceps contraction: fair    Additional Strength Details  Proper superior patellar translation with isolated contraction.     Tests     Additional Tests Details  WELLS DVT CRITERIA:  DVT Risk  (+1) Active cancer within last 6 months negative  (+1) Paralysis, paresis or recent plaster immobilization of legs  negative  (+1) Recent Bedridden for >3 days or major surgery within last 12 weeks  positive  (+1) Localized tenderness along deep venous system  negative  (+1) Entire leg swelling  negative  (+1) Calf swelling > 3cm compared with asymptomatic leg (10cm below tibial tuberosity)  negative  (+1) Pitting Edema  negative  (+1) Collateral supervicial veins (non-varicose)  negative  (+1) Previously documented DVT  negative    (-2)  Alternative diagnosis more likely (muscle tear, cellulitis, etc)  negative    Score: 1  DVT  considered likely in patients with score of 2 or more, unlikely if less than 2        Swelling     Left Knee Girth Measurement (cm)   Joint line: 50.5 cm    Right Knee Girth Measurement (cm)   Joint line: 43 cm    Ambulation   Weight-Bearing Status   Weight-Bearing Status (Left): weight-bearing as tolerated   Assistive device used: front-wheeled walker    Ambulation: Level Surfaces   Ambulation with assistive device: independent    Observational Gait   Gait: antalgic   Right stance time, right swing time and right step length within functional limits. Decreased walking speed, stride length, left stance time, left swing time and left step length.   Left foot contact pattern: foot flat  Right foot contact pattern: heel to toe  Right arm swing: within functional limits  Base of support: increased    Comments   TU seconds with RW. UE for support     Functional Assessment        Comments  Functional sit <>stand: unable to perform without UE support   5xSTS: 13 seconds with UE support           Precautions: hearing loss (increased       Manuals             Manual knee stretching (FLEX AND EXT)             Patellar mobs              tibiofemoral joint mobs                          Neuro Re-Ed             Quad set with strap              Weight shifts LAT             Staggered Weight Shifts              Supine knee extension stretch with weight              TKE             SLS                          Ther Ex             SAQ             LAQ             3-way hip              Heel slides supine or seated              Seated static hold in flexion             Stool extension stretch with over pressure              Heel raises              Standing marches              Standing hamstring curls                                        NuStep-->BIke             Ther Activity             Sit to stands             Mini squats              Step ups              Step downs             Stairs             Gait Training              Hurdles                           Modalities

## 2025-01-31 NOTE — LETTER
2025    Van Turner MD  [address] [facility] [address]    Patient: Sailaja Stubbs   YOB: 1955   Date of Visit: 2025     Encounter Diagnosis     ICD-10-CM    1. Total knee replacement status, left  Z96.652       2. Chronic pain of left knee  M25.562     G89.29           Dear Dr. Turner:    Thank you for your recent referral of Sailaja Stubbs. Please review the attached evaluation summary from Sailaja's recent visit.     Please verify that you agree with the plan of care by signing the attached order.     If you have any questions or concerns, please do not hesitate to call.     I sincerely appreciate the opportunity to share in the care of one of your patients and hope to have another opportunity to work with you in the near future.       Sincerely,    Dian Pagan, PT      Referring Provider:      I certify that I have read the below Plan of Care and certify the need for these services furnished under this plan of treatment while under my care.                    Van Turner MD  [address] [facility] [address]  Via Fax: [phone]          PT EVALUATION/1st POST-OP    Today's date: 25  Patient name: Sailaja Stubbs  : 1955  MRN: [id number]  Referring provider: Van Turner MD  Dx:   1. Total knee replacement status, left    2. Chronic pain of left knee            ASSESSMENT:   Sailaja Stubbs is a 69 y.o. female who is s/p Left TKA due to chronic pain.  Surgery was performed on 25 with Dr. Turner of [facility]. Patient presents ambulating with RW WBAT. Patient notes that pain is well controlled. Incision is intact and does not show signs of infection. Patient is neurological intact and no signs of DVT based on Wells Criteria. Overall, patient presents with pain, decreased strength, decreased ROM, and decreased joint mobility. Patient demonstrating normal post operative objective measurements based on post-op status. Due to these impairments, Patient  has difficulty performing a/iadls and recreational activities. Patient would benefit from skilled physical therapy to address the impairments, improve their level of function, and to improve their overall quality of life.      Impairments:    restricted ROM    decreased strength   pain with function   activity intolerance   weight bearing intolerance   abnormal gait     Prognosis:  Good  Positive and negative prognostic indicator(s):  pain >3 months and multiple comorbidities    Goals:     Short Term Goals: to be achieved by 4 weeks  1) Patient to be independent with basic HEP.  2) Decrease pain to 5/10 at its worst.  3) Increase knee ROM by 5-10 degrees   4) Increase LE strength by 1/2 MMT grade in all deficient planes.    Long Term Goals: to be achieved by discharge  1) FOTO equal to or greater than target score indicating improvements with overall function.  2) Ambulation to improve to maximal level of function  3) Stair negotiation will improve to reciprocal.  4) Sit to stand transfers will improve to maximal level of function       Planned interventions:  home exercise program, patient education, manual therapy, graded activity, flexibility, functional range of motion exercises, strengthening, abdominal trunk stabilization, balance and weight bearing training, gait training, and modalities prn    Duration in visits:  8-12  Frequency: 2 visits per week  Duration in weeks:  6-8    History of Current Injury: Patient notes that she had surgery on 1/28 with Dr. Turner. Patient notes that surgery went with no complications. Patient notes that she I getting around at home well. Patient notes that she is staying on the first floor so only has to do one flight of stairs. Patient notes that she did well coming down today for this appointment. Patient note that pain is well controlled with tylenol and has yet to take the Oxy. Patient notes that the first 1-2 she still had the nerve block so she felt really weak but that is  improving. Patient notes that sleeping is going well. Patient notes that she has the bike at home that she is using everyday.     Pain location: joint line global   Pain description: dull ache   Pain at worst: 5-6/10   Pain at best: 1-2/10       Aggravating factors: after using the bike   Easing factors: game ready at home for icing, rest     Imaging: see imaging tab   Special Questions: Has BLE edema from varicose veins. Denies numbness of tingling in the legs and feet.     HOME SET-UP  Social support: lives with  that will be able to help.   Home Type: 2 story home  MAYKEL: through garage you walk in on ground level and go up full flight of stairs to main level. Wide stair case with one railing.   Steps Inside: full flight of stairs to master bedroom. There is a spare room on first floor with bathroom that she will be able to use.   Shower: full bathroom on first floor with walk in shower. Grab bars and shower bench.   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   Patient goals: Patient reports goals for physical therapy would be decreased pain, increased mobility, increased strength, and return to recreation  return to recreational walking program       Objective     Observations   Left Knee   Positive for edema, effusion and incision.     Additional Observation Details  Incision intact with steri-strips  Patient donned in compression stockings.   No active drainage, some dried blood.   No signs infection.   Mild redness, bruising, and swelling globally.     Tenderness   Left Knee   Tenderness in the lateral joint line and medial joint line.     Neurological Testing     Sensation     Knee   Left Knee   Intact: Light touch    Right Knee   Intact: light touch     Active Range of  Motion   Left Knee   Flexion: 87 degrees with pain  Extension: -10 degrees with pain    Mobility   Patellar Mobility:   Left Knee   Hypomobile: left medial, left lateral, left superior and left inferior    Strength/Myotome Testing     Left Knee   Flexion: 3  Extension: 3  Quadriceps contraction: fair    Additional Strength Details  Proper superior patellar translation with isolated contraction.     Tests     Additional Tests Details  WELLS DVT CRITERIA:  DVT Risk  (+1) Active cancer within last 6 months negative  (+1) Paralysis, paresis or recent plaster immobilization of legs  negative  (+1) Recent Bedridden for >3 days or major surgery within last 12 weeks  positive  (+1) Localized tenderness along deep venous system  negative  (+1) Entire leg swelling  negative  (+1) Calf swelling > 3cm compared with asymptomatic leg (10cm below tibial tuberosity)  negative  (+1) Pitting Edema  negative  (+1) Collateral supervicial veins (non-varicose)  negative  (+1) Previously documented DVT  negative    (-2)  Alternative diagnosis more likely (muscle tear, cellulitis, etc)  negative    Score: 1  DVT considered likely in patients with score of 2 or more, unlikely if less than 2        Swelling     Left Knee Girth Measurement (cm)   Joint line: 50.5 cm    Right Knee Girth Measurement (cm)   Joint line: 43 cm    Ambulation   Weight-Bearing Status   Weight-Bearing Status (Left): weight-bearing as tolerated   Assistive device used: front-wheeled walker    Ambulation: Level Surfaces   Ambulation with assistive device: independent    Observational Gait   Gait: antalgic   Right stance time, right swing time and right step length within functional limits. Decreased walking speed, stride length, left stance time, left swing time and left step length.   Left foot contact pattern: foot flat  Right foot contact pattern: heel to toe  Right arm swing: within functional limits  Base of support: increased    Comments   TU seconds with RW.  UE for support     Functional Assessment        Comments  Functional sit <>stand: unable to perform without UE support   5xSTS: 13 seconds with UE support           Precautions: hearing loss (increased       Manuals             Manual knee stretching (FLEX AND EXT)             Patellar mobs              tibiofemoral joint mobs                          Neuro Re-Ed             Quad set with strap              Weight shifts LAT             Staggered Weight Shifts              Supine knee extension stretch with weight              TKE             SLS                          Ther Ex             SAQ             LAQ             3-way hip              Heel slides supine or seated              Seated static hold in flexion             Stool extension stretch with over pressure              Heel raises              Standing marches              Standing hamstring curls                                        NuStep-->BIke             Ther Activity             Sit to stands             Mini squats              Step ups              Step downs             Stairs             Gait Training             Hurdles                           Modalities

## 2025-02-04 ENCOUNTER — OFFICE VISIT (OUTPATIENT)
Dept: PHYSICAL THERAPY | Facility: CLINIC | Age: 70
End: 2025-02-04
Payer: MEDICARE

## 2025-02-04 DIAGNOSIS — Z96.652 TOTAL KNEE REPLACEMENT STATUS, LEFT: Primary | ICD-10-CM

## 2025-02-04 DIAGNOSIS — M25.562 CHRONIC PAIN OF LEFT KNEE: ICD-10-CM

## 2025-02-04 DIAGNOSIS — G89.29 CHRONIC PAIN OF LEFT KNEE: ICD-10-CM

## 2025-02-04 PROCEDURE — 97112 NEUROMUSCULAR REEDUCATION: CPT | Performed by: PHYSICAL THERAPIST

## 2025-02-04 PROCEDURE — 97110 THERAPEUTIC EXERCISES: CPT | Performed by: PHYSICAL THERAPIST

## 2025-02-04 PROCEDURE — 97140 MANUAL THERAPY 1/> REGIONS: CPT | Performed by: PHYSICAL THERAPIST

## 2025-02-04 NOTE — PROGRESS NOTES
Daily Note     Today's date: 2025  Patient name: Sailaja Stubbs  : 1955  MRN: [id number]  Referring provider: Van Turner MD  Dx:   Encounter Diagnosis     ICD-10-CM    1. Total knee replacement status, left  Z96.652       2. Chronic pain of left knee  M25.562     G89.29           Start Time: 1400  Stop Time: 1445  Total time in clinic (min): 45 minutes    Subjective: Patient notes that she has been using the bike at home that the doctor gave her. Patient notes that she likes it cause she gets a good stretch with it. Patient notes that she was been walking a lot and doing the bike but not really the exercises much at home.       Objective: See treatment diary below    Flexion: 95 degrees  Extension: -5 degrees     Assessment: Tolerated treatment well. Patient responded well to the introduction of exercise program this date. No flare up in pain. Patient demonstrates decreased ROM ext>flex. Mild discomfort throughout manual stretching. Encouraged performance of HEP to promote improved extension motion. Discussed and educated patient in expectations of post exercise soreness over the next 24-48 hours after initiating new exercise program. Patient demonstrated understanding. Will continue to progress as able. Patient would benefit from continued PT      Plan: Continue per plan of care.      Precautions: hearing loss (increased volume)      Manuals 2/4            Manual knee stretching (FLEX AND EXT) ACP            Patellar mobs  ACP            tibiofemoral joint mobs                          Neuro Re-Ed             Quad set with strap  :05x2'            Weight shifts LAT             Staggered Weight Shifts              Supine knee extension stretch with weight              TKE             SLS                          Ther Ex             SAQ             LAQ             3-way hip  3x10 ea LLE only             Heel slides supine or seated  Seated and supine :10x2'            Seated static hold in flexion              Stool extension stretch with over pressure              Heel raises  3x10             Standing marches              Standing hamstring curls  2x10                                       NuStep-->BIke Lvl 2 5 mins             Ther Activity             Sit to stands             Mini squats              Step ups              Step downs             Stairs             Gait Training             Hurdles                           Modalities

## 2025-02-07 ENCOUNTER — OFFICE VISIT (OUTPATIENT)
Dept: PHYSICAL THERAPY | Facility: CLINIC | Age: 70
End: 2025-02-07
Payer: MEDICARE

## 2025-02-07 DIAGNOSIS — M25.562 CHRONIC PAIN OF LEFT KNEE: Primary | ICD-10-CM

## 2025-02-07 DIAGNOSIS — G89.29 CHRONIC PAIN OF LEFT KNEE: Primary | ICD-10-CM

## 2025-02-07 DIAGNOSIS — Z96.652 TOTAL KNEE REPLACEMENT STATUS, LEFT: ICD-10-CM

## 2025-02-07 PROCEDURE — 97110 THERAPEUTIC EXERCISES: CPT | Performed by: PHYSICAL THERAPIST

## 2025-02-07 PROCEDURE — 97140 MANUAL THERAPY 1/> REGIONS: CPT | Performed by: PHYSICAL THERAPIST

## 2025-02-07 PROCEDURE — 97112 NEUROMUSCULAR REEDUCATION: CPT | Performed by: PHYSICAL THERAPIST

## 2025-02-07 NOTE — PROGRESS NOTES
Daily Note     Today's date: 2025  Patient name: Sailaja Stubbs  : 1955  MRN: [id number]  Referring provider: Van Turner MD  Dx:   Encounter Diagnosis     ICD-10-CM    1. Chronic pain of left knee  M25.562     G89.29       2. Total knee replacement status, left  Z96.652             Start Time: 1400  Stop Time: 1445  Total time in clinic (min): 45 minutes    Subjective: Patient notes she continues to use the bike at home. Patient notes that pain is well controlled an ore just achy. Patient notes that she has been walking around without her walker so she didn't feel like she needed to bring it today. Patient reports she has been trying to do the exercises more.       Objective: See treatment diary below      Assessment: Tolerated treatment well. Patient continues to demonstrate decreased ROM ext>flex. Moderate posterior knee discomfort and muscle guarding throughout extension manual stretching. Introduced SAQ with good response and proper quad activation. Encouraged performance of HEP to promote improved extension motion. Will continue to progress as able. Patient would benefit from continued PT      Plan: Continue per plan of care.      Precautions: hearing loss (increased volume)      Manuals            Manual knee stretching (FLEX AND EXT) ACP ACP           Patellar mobs  ACP ACP           tibiofemoral joint mobs                          Neuro Re-Ed             Quad set with strap  :05x2' :05x2'           Weight shifts LAT             Staggered Weight Shifts              Supine knee extension stretch with weight              TKE             SLS                          Ther Ex             SAQ  2x10           LAQ             3-way hip  3x10 ea LLE only  3x10 ea LLE only            Heel slides supine or seated  Seated and supine :10x2' Seated and supine :10x2'           Seated static hold in flexion             Stool extension stretch with over pressure              Heel raises  3x10  3x10           "  Standing hamstring curls  2x10  2x10                                     NuStep-->BIke Lvl 2 5 mins  Lvl 2 5 mins           Ther Activity             Sit to stands             Mini squats              Step ups  4\" LLE only 2x10  4\" LLE only 2x10            Step downs             Stairs             Gait Training             Hurdles                           Modalities                                                "

## 2025-02-11 ENCOUNTER — OFFICE VISIT (OUTPATIENT)
Dept: PHYSICAL THERAPY | Facility: CLINIC | Age: 70
End: 2025-02-11
Payer: MEDICARE

## 2025-02-11 DIAGNOSIS — M25.562 CHRONIC PAIN OF LEFT KNEE: Primary | ICD-10-CM

## 2025-02-11 DIAGNOSIS — G89.29 CHRONIC PAIN OF LEFT KNEE: Primary | ICD-10-CM

## 2025-02-11 DIAGNOSIS — Z96.652 TOTAL KNEE REPLACEMENT STATUS, LEFT: ICD-10-CM

## 2025-02-11 PROCEDURE — 97112 NEUROMUSCULAR REEDUCATION: CPT | Performed by: PHYSICAL THERAPIST

## 2025-02-11 PROCEDURE — 97110 THERAPEUTIC EXERCISES: CPT | Performed by: PHYSICAL THERAPIST

## 2025-02-11 PROCEDURE — 97140 MANUAL THERAPY 1/> REGIONS: CPT | Performed by: PHYSICAL THERAPIST

## 2025-02-11 NOTE — PROGRESS NOTES
Daily Note     Today's date: 2025  Patient name: Sailaja Stubbs  : 1955  MRN: [id number]  Referring provider: Van Turner MD  Dx:   Encounter Diagnosis     ICD-10-CM    1. Chronic pain of left knee  M25.562     G89.29       2. Total knee replacement status, left  Z96.652               Start Time: 1400  Stop Time: 1445  Total time in clinic (min): 45 minutes    Subjective: Patient notes that she continues to have swelling and stiffness. Patient notes that she wishes it would just feel normal already. Patient notes that it is getting better everyday and the bike does seem to help. Patient notes that she has been trying to do the exercises at home.       Objective: See treatment diary below      Assessment: Tolerated treatment well. Continued with established POC with good response. Introduced new exercises to promote extension ROM. Patient demonstrated improved isolated quad contraction with proper superior patellar translation. Continues to have moderate posterior discomfort with extension stretching but motion has improved since last session. Encouraged performance of HEP to promote improved extension motion. Will continue to progress as able. Patient would benefit from continued PT      Plan: Continue per plan of care.      Precautions: hearing loss (increased volume)      Manuals           Manual knee stretching (FLEX AND EXT) ACP ACP ACP           Patellar mobs  ACP ACP ACP           tibiofemoral joint mobs                          Neuro Re-Ed             Quad set with strap  :05x2' :05x2' No strap   :05x10           Weight shifts LAT             Staggered Weight Shifts              Supine knee extension stretch with weight              TKE             SLS                          Ther Ex             SAQ  2x10 2x10           LAQ             3-way hip  3x10 ea LLE only  3x10 ea LLE only  3x10 ea LLE only           Heel slides supine or seated  Seated and supine :10x2' Seated and supine :10x2'  "Seated and supine :10x2          Gastro stretch SB    :30x2           Retrowalking at // bar    6x           Heel raises  3x10  3x10  3x10           Standing hamstring curls  2x10  2x10 2x10                                     NuStep-->BIke Lvl 2 5 mins  Lvl 2 5 mins Lvl 2 5 mins          Ther Activity             Sit to stands             Mini squats              Step ups  4\" LLE only 2x10  4\" LLE only 2x10  4\" LLE only 2x10           Step downs             Stairs             Gait Training             Hurdles                           Modalities                                                "

## 2025-02-14 ENCOUNTER — OFFICE VISIT (OUTPATIENT)
Dept: PHYSICAL THERAPY | Facility: CLINIC | Age: 70
End: 2025-02-14
Payer: MEDICARE

## 2025-02-14 DIAGNOSIS — Z96.652 TOTAL KNEE REPLACEMENT STATUS, LEFT: ICD-10-CM

## 2025-02-14 DIAGNOSIS — M25.562 CHRONIC PAIN OF LEFT KNEE: Primary | ICD-10-CM

## 2025-02-14 DIAGNOSIS — G89.29 CHRONIC PAIN OF LEFT KNEE: Primary | ICD-10-CM

## 2025-02-14 PROCEDURE — 97140 MANUAL THERAPY 1/> REGIONS: CPT | Performed by: PHYSICAL THERAPIST

## 2025-02-14 PROCEDURE — 97110 THERAPEUTIC EXERCISES: CPT | Performed by: PHYSICAL THERAPIST

## 2025-02-14 PROCEDURE — 97112 NEUROMUSCULAR REEDUCATION: CPT | Performed by: PHYSICAL THERAPIST

## 2025-02-14 NOTE — PROGRESS NOTES
Daily Note     Today's date: 2025  Patient name: Sailaja Stubbs  : 1955  MRN: [id number]  Referring provider: Van Turner MD  Dx:   Encounter Diagnosis     ICD-10-CM    1. Chronic pain of left knee  M25.562     G89.29       2. Total knee replacement status, left  Z96.652                 Start Time: 930  Stop Time: 1015  Total time in clinic (min): 45 minutes    Subjective: Patient reports she had her follow up with the doctor and it went well. Patient notes that they are happy with how it looks and said that she is 70% ahead of where she should be at this point.       Objective: See treatment diary below    Flexion: 116 degrees   Extension: 3 degrees       Assessment: Tolerated treatment well. Continued with established POC with good response. Continues to have moderate posterior discomfort with extension stretching but motion has improved since last session. Encouraged performance of HEP to promote improved extension motion. Will continue to progress as able. Patient would benefit from continued PT      Plan: Continue per plan of care.      Precautions: hearing loss (increased volume)      Manuals          Manual knee stretching (FLEX AND EXT) ACP ACP ACP  ACP         Patellar mobs  ACP ACP ACP           tibiofemoral joint mobs                          Neuro Re-Ed             Quad set with strap  :05x2' :05x2' No strap   :05x10  No strap   :05x10          Weight shifts LAT             Staggered Weight Shifts              Supine knee extension stretch with weight              TKE             SLS                          Ther Ex             SAQ  2x10 2x10  2x10          LAQ             3-way hip  3x10 ea LLE only  3x10 ea LLE only  3x10 ea LLE only  10x BLE this date          Heel slides supine or seated  Seated and supine :10x2' Seated and supine :10x2' Seated and supine :10x2 Seated  :10x2         Gastro stretch SB    :30x2  :30x2          Retrowalking at // bar    6x  4x full / bar       "    Heel raises  3x10  3x10  3x10  3x10         Standing hamstring curls  2x10  2x10 2x10  2x10                                   NuStep-->BIke Lvl 2 5 mins  Lvl 2 5 mins Lvl 2 5 mins Lvl 2 5 mins         Ther Activity             Sit to stands             Mini squats     2x10          Step ups  4\" LLE only 2x10  4\" LLE only 2x10  4\" LLE only 2x10  4\" LLE only 2x10          Step downs             Stairs             Gait Training             Hurdles                           Modalities                                                "

## 2025-02-18 ENCOUNTER — OFFICE VISIT (OUTPATIENT)
Dept: PHYSICAL THERAPY | Facility: CLINIC | Age: 70
End: 2025-02-18
Payer: MEDICARE

## 2025-02-18 DIAGNOSIS — Z96.652 TOTAL KNEE REPLACEMENT STATUS, LEFT: ICD-10-CM

## 2025-02-18 DIAGNOSIS — M25.562 CHRONIC PAIN OF LEFT KNEE: Primary | ICD-10-CM

## 2025-02-18 DIAGNOSIS — G89.29 CHRONIC PAIN OF LEFT KNEE: Primary | ICD-10-CM

## 2025-02-18 PROCEDURE — 97110 THERAPEUTIC EXERCISES: CPT | Performed by: PHYSICAL THERAPIST

## 2025-02-18 PROCEDURE — 97116 GAIT TRAINING THERAPY: CPT | Performed by: PHYSICAL THERAPIST

## 2025-02-18 PROCEDURE — 97112 NEUROMUSCULAR REEDUCATION: CPT | Performed by: PHYSICAL THERAPIST

## 2025-02-18 NOTE — PROGRESS NOTES
Daily Note     Today's date: 2025  Patient name: Sailaja Stubbs  : 1955  MRN: [id number]  Referring provider: Van Turner MD  Dx:   Encounter Diagnosis     ICD-10-CM    1. Chronic pain of left knee  M25.562     G89.29       2. Total knee replacement status, left  Z96.652                   Start Time: 1400  Stop Time: 1445  Total time in clinic (min): 45 minutes    Subjective: Patient reports the knee continues to feel better.       Objective: See treatment diary below    Flexion: 118 degrees   Extension: 3 degrees     Assessment: Tolerated treatment well. Continued improvement with ROM with near normal ranges. Introduced hurdles for gait mechanics with no flare up in pain. Minimal compensations with initiation of step downs. Will continue to progress as able. Patient would benefit from continued PT      Plan: Continue per plan of care.      Precautions: hearing loss (increased volume)      Manuals         Manual knee stretching (FLEX AND EXT) ACP ACP ACP  ACP ACP        Patellar mobs  ACP ACP ACP           tibiofemoral joint mobs                          Neuro Re-Ed             Quad set with strap  :05x2' :05x2' No strap   :05x10  No strap   :05x10  No strap   :05x10         Weight shifts LAT             Staggered Weight Shifts              Supine knee extension stretch with weight              TKE             SLS                          Ther Ex             SAQ  2x10 2x10  2x10           LAQ     2x10         3-way hip  3x10 ea LLE only  3x10 ea LLE only  3x10 ea LLE only  10x BLE this date  2x10 BLE         Heel slides supine or seated  Seated and supine :10x2' Seated and supine :10x2' Seated and supine :10x2 Seated  :10x2 Seated  :10x2        Gastro stretch SB    :30x2  :30x2          Retrowalking at // bar    6x  4x full / bar  4x full / bar         Heel raises  3x10  3x10  3x10  3x10 3x10         Standing hamstring curls  2x10  2x10 2x10  2x10 2x10                                 "  NuStep-->BIke Lvl 2 5 mins  Lvl 2 5 mins Lvl 2 5 mins Lvl 2 5 mins Lvl 2 5 mins        Ther Activity             Step downs      6\" 2x10         Mini squats     2x10          Step ups  4\" LLE only 2x10  4\" LLE only 2x10  4\" LLE only 2x10  4\" LLE only 2x10  6\" LLE only 2x10         Step downs             Stairs             Gait Training             Hurdles      Reciprocal stepping 4 hurdles   6x                      Modalities                                                "

## 2025-02-21 ENCOUNTER — OFFICE VISIT (OUTPATIENT)
Dept: PHYSICAL THERAPY | Facility: CLINIC | Age: 70
End: 2025-02-21
Payer: MEDICARE

## 2025-02-21 DIAGNOSIS — G89.29 CHRONIC PAIN OF LEFT KNEE: Primary | ICD-10-CM

## 2025-02-21 DIAGNOSIS — Z96.652 TOTAL KNEE REPLACEMENT STATUS, LEFT: ICD-10-CM

## 2025-02-21 DIAGNOSIS — M25.562 CHRONIC PAIN OF LEFT KNEE: Primary | ICD-10-CM

## 2025-02-21 PROCEDURE — 97530 THERAPEUTIC ACTIVITIES: CPT | Performed by: PHYSICAL THERAPIST

## 2025-02-21 PROCEDURE — 97116 GAIT TRAINING THERAPY: CPT | Performed by: PHYSICAL THERAPIST

## 2025-02-21 PROCEDURE — 97110 THERAPEUTIC EXERCISES: CPT | Performed by: PHYSICAL THERAPIST

## 2025-02-21 NOTE — PROGRESS NOTES
Daily Note     Today's date: 2025  Patient name: Sailaja Stubbs  : 1955  MRN: [id number]  Referring provider: Van Turner MD  Dx:   Encounter Diagnosis     ICD-10-CM    1. Chronic pain of left knee  M25.562     G89.29       2. Total knee replacement status, left  Z96.652                     Start Time: 1400  Stop Time: 1445  Total time in clinic (min): 45 minutes    Subjective: Patient reports no new changes since last session. Patient reports the knee continues to feel better everyday.       Objective: See treatment diary below    Flexion: 121 degrees   Extension: 2 degrees     Assessment: Tolerated treatment well. Continued with established POC. Patient able to perform ascending and descending stairs in clinic reciprocally with 1-2 hand railings. Little to no trunk or hip compensations throughout. Improving tolerance to manual treatment. Near normal ranges for PROM in both extension and flexion.  Will continue to progress as able. Patient would benefit from continued PT      Plan: Continue per plan of care.      Precautions: hearing loss (increased volume)      Manuals        Manual knee stretching (FLEX AND EXT) ACP ACP ACP  ACP ACP ACP        Patellar mobs  ACP ACP ACP           tibiofemoral joint mobs                          Neuro Re-Ed             Quad set with strap  :05x2' :05x2' No strap   :05x10  No strap   :05x10  No strap   :05x10         TKE             SLS                          Ther Ex             SAQ  2x10 2x10  2x10           LAQ     2x10  3x10        3-way hip  3x10 ea LLE only  3x10 ea LLE only  3x10 ea LLE only  10x BLE this date  2x10 BLE  2x10 BLE       Heel slides supine or seated  Seated and supine :10x2' Seated and supine :10x2' Seated and supine :10x2 Seated  :10x2 Seated  and supine :10x2 Pball roll ins     :10x2'       Gastro stretch SB    :30x2  :30x2   :30x2        Retrowalking at // bar    6x  4x full / bar  4x full / bar         Heel raises   "3x10  3x10  3x10  3x10 3x10  3x10       Standing hamstring curls  2x10  2x10 2x10  2x10 2x10 2x10        Sitting hamstring stretch on stool       :20x3                     NuStep-->BIke Lvl 2 5 mins  Lvl 2 5 mins Lvl 2 5 mins Lvl 2 5 mins Lvl 2 5 mins Lvl 2 6 mins Try recumbent NV      Ther Activity             Step downs      6\" 2x10  6\" 2x10  8\" NV      Mini squats     2x10          Step ups  4\" LLE only 2x10  4\" LLE only 2x10  4\" LLE only 2x10  4\" LLE only 2x10  6\" LLE only 2x10  6\" 2x10        Step downs             Stairs in clinic       Reciprocal ascending and descending   2x ea        Gait Training             Hurdles      Reciprocal stepping 4 hurdles   6x  Reciprocal stepping 4 hurdles   6x                     Modalities                                                "

## 2025-02-25 ENCOUNTER — OFFICE VISIT (OUTPATIENT)
Dept: PHYSICAL THERAPY | Facility: CLINIC | Age: 70
End: 2025-02-25
Payer: MEDICARE

## 2025-02-25 DIAGNOSIS — Z96.652 TOTAL KNEE REPLACEMENT STATUS, LEFT: ICD-10-CM

## 2025-02-25 DIAGNOSIS — G89.29 CHRONIC PAIN OF LEFT KNEE: Primary | ICD-10-CM

## 2025-02-25 DIAGNOSIS — M25.562 CHRONIC PAIN OF LEFT KNEE: Primary | ICD-10-CM

## 2025-02-25 PROCEDURE — 97110 THERAPEUTIC EXERCISES: CPT | Performed by: PHYSICAL THERAPIST

## 2025-02-25 PROCEDURE — 97530 THERAPEUTIC ACTIVITIES: CPT | Performed by: PHYSICAL THERAPIST

## 2025-02-25 PROCEDURE — 97116 GAIT TRAINING THERAPY: CPT | Performed by: PHYSICAL THERAPIST

## 2025-02-25 NOTE — PROGRESS NOTES
Daily Note     Today's date: 2025  Patient name: Sailaja Stubbs  : 1955  MRN: [id number]  Referring provider: Van Turner MD  Dx:   Encounter Diagnosis     ICD-10-CM    1. Chronic pain of left knee  M25.562     G89.29       2. Total knee replacement status, left  Z96.652                       Start Time: 1400  Stop Time: 1445  Total time in clinic (min): 45 minutes    Subjective: Patient reports no new changes since last session. Patient notes that she plans to try to do an alteration next Friday and wasn't sure if she is okay to do that since she has to sit on the floor.       Objective: See treatment diary below       Assessment: Tolerated treatment well. Patient responded well to new exercises and progressions without flare up in pain. Introduced modified lunges in order to prepare for floor transfers. Patient continues to have discomfort with extension stretching.   Will continue to progress as able. Patient would benefit from continued PT      Plan: Continue per plan of care.      Precautions: hearing loss (increased volume)      Manuals       Manual knee stretching (FLEX AND EXT) ACP ACP ACP  ACP ACP ACP  ACP       Patellar mobs  ACP ACP ACP           tibiofemoral joint mobs                          Neuro Re-Ed             Quad set with strap  :05x2' :05x2' No strap   :05x10  No strap   :05x10  No strap   :05x10         TKE             SLS                          Ther Ex             SAQ  2x10 2x10  2x10           LAQ     2x10  3x10  Leg ext/flex machine     11# BLE   10x ea       3-way hip  3x10 ea LLE only  3x10 ea LLE only  3x10 ea LLE only  10x BLE this date  2x10 BLE  2x10 BLE 2x10 BLE  2#      Heel slides supine or seated  Seated and supine :10x2' Seated and supine :10x2' Seated and supine :10x2 Seated  :10x2 Seated  and supine :10x2 Pball roll ins     :10x2'       Gastro stretch SB    :30x2  :30x2   :30x2        Retrowalking at // bar    6x  4x full / bar  4x  "full / bar         Heel raises  3x10  3x10  3x10  3x10 3x10  3x10       Standing hamstring curls  2x10  2x10 2x10  2x10 2x10 2x10        Sitting hamstring stretch on stool       :20x3        Modified lunges with bolster              NuStep-->BIke Lvl 2 5 mins  Lvl 2 5 mins Lvl 2 5 mins Lvl 2 5 mins Lvl 2 5 mins Lvl 2 6 mins Recumbent bike 6 mins L1      Ther Activity             Step downs      6\" 2x10  6\" 2x10  6\" 2x10       Mini squats     2x10          Step ups  4\" LLE only 2x10  4\" LLE only 2x10  4\" LLE only 2x10  4\" LLE only 2x10  6\" LLE only 2x10  6\" 2x10  8\" 2x10      Modified lunges to bolster        2x10 LLE       Stairs in clinic       Reciprocal ascending and descending   2x ea        Gait Training             Hurdles      Reciprocal stepping 4 hurdles   6x  Reciprocal stepping 4 hurdles   6x                     Modalities                                                "

## 2025-02-28 ENCOUNTER — OFFICE VISIT (OUTPATIENT)
Dept: PHYSICAL THERAPY | Facility: CLINIC | Age: 70
End: 2025-02-28
Payer: MEDICARE

## 2025-02-28 DIAGNOSIS — M25.562 CHRONIC PAIN OF LEFT KNEE: Primary | ICD-10-CM

## 2025-02-28 DIAGNOSIS — G89.29 CHRONIC PAIN OF LEFT KNEE: Primary | ICD-10-CM

## 2025-02-28 DIAGNOSIS — Z96.652 TOTAL KNEE REPLACEMENT STATUS, LEFT: ICD-10-CM

## 2025-02-28 PROCEDURE — 97110 THERAPEUTIC EXERCISES: CPT | Performed by: PHYSICAL THERAPIST

## 2025-02-28 PROCEDURE — 97530 THERAPEUTIC ACTIVITIES: CPT | Performed by: PHYSICAL THERAPIST

## 2025-02-28 PROCEDURE — 97116 GAIT TRAINING THERAPY: CPT | Performed by: PHYSICAL THERAPIST

## 2025-02-28 NOTE — PROGRESS NOTES
Daily Note     Today's date: 2025  Patient name: Sailaja Stubbs  : 1955  MRN: [id number]  Referring provider: Van Turner MD  Dx:   Encounter Diagnosis     ICD-10-CM    1. Chronic pain of left knee  M25.562     G89.29       2. Total knee replacement status, left  Z96.652           Start Time: 1145  Stop Time: 1225  Total time in clinic (min): 40 minutes    Subjective: Patient reports mild muscle soreness after new exercises last session. Patient notes that she isn't sleeping well at night because she can't find a comfortable position for the knee. Patient describes it more like they are restless rather than the knee being painful    Objective: See treatment diary below       Assessment: Tolerated treatment well. Good response to new exercises this date. ROM is near normal with starting to introduce more strengthen secondary to this. Patient continues to have the most discomfort with manual stretching ext>flex. Will continue to progress as able. Patient would benefit from continued PT      Plan: Continue per plan of care.      Precautions: hearing loss (increased volume)      Manuals      Manual knee stretching (FLEX AND EXT) ACP ACP ACP  ACP ACP ACP  ACP  ACP     Patellar mobs  ACP ACP ACP           tibiofemoral joint mobs                          Neuro Re-Ed             Quad set with strap  :05x2' :05x2' No strap   :05x10  No strap   :05x10  No strap   :05x10         Standing cross legged extension stretch         :10x5 (right leg crossed over left pushing left knee into extension)                                Ther Ex             SAQ  2x10 2x10  2x10           LAQ     2x10  3x10        Knee flexion/extension machine        Leg ext/flex machine     11# BLE   10x ea  Leg ext/flex machine     11# BLE   2x10  ea      3-way hip  3x10 ea LLE only  3x10 ea LLE only  3x10 ea LLE only  10x BLE this date  2x10 BLE  2x10 BLE 2x10 BLE  2#      Heel slides supine or seated   "Seated and supine :10x2' Seated and supine :10x2' Seated and supine :10x2 Seated  :10x2 Seated  and supine :10x2 Pball roll ins     :10x2'       Gastro stretch SB    :30x2  :30x2   :30x2   :30x2      Retrowalking at // bar    6x  4x full / bar  4x full / bar         Heel raises  3x10  3x10  3x10  3x10 3x10  3x10       Standing hamstring curls  2x10  2x10 2x10  2x10 2x10 2x10        Sitting hamstring stretch on stool       :20x3        Leg press         BLE 2x10 30#      NuStep-->BIke Lvl 2 5 mins  Lvl 2 5 mins Lvl 2 5 mins Lvl 2 5 mins Lvl 2 5 mins Lvl 2 6 mins Recumbent bike 6 mins L1 Recumbent bike 6 mins L1     Ther Activity             Step downs      6\" 2x10  6\" 2x10  6\" 2x10  8\" 2x10     Mini squats     2x10          Step ups  4\" LLE only 2x10  4\" LLE only 2x10  4\" LLE only 2x10  4\" LLE only 2x10  6\" LLE only 2x10  6\" 2x10  8\" 2x10 8\" 2x10     Modified lunges to bolster        2x10 LLE  2x10 LLE     Stairs in clinic       Reciprocal ascending and descending   2x ea        Gait Training             Hurdles      Reciprocal stepping 4 hurdles   6x  Reciprocal stepping 4 hurdles   6x                     Modalities                                                "

## 2025-03-04 ENCOUNTER — EVALUATION (OUTPATIENT)
Dept: PHYSICAL THERAPY | Facility: CLINIC | Age: 70
End: 2025-03-04
Payer: MEDICARE

## 2025-03-04 DIAGNOSIS — G89.29 CHRONIC PAIN OF LEFT KNEE: Primary | ICD-10-CM

## 2025-03-04 DIAGNOSIS — Z96.652 TOTAL KNEE REPLACEMENT STATUS, LEFT: ICD-10-CM

## 2025-03-04 DIAGNOSIS — M25.562 CHRONIC PAIN OF LEFT KNEE: Primary | ICD-10-CM

## 2025-03-04 PROCEDURE — 97530 THERAPEUTIC ACTIVITIES: CPT | Performed by: PHYSICAL THERAPIST

## 2025-03-04 PROCEDURE — 97116 GAIT TRAINING THERAPY: CPT | Performed by: PHYSICAL THERAPIST

## 2025-03-04 PROCEDURE — 97110 THERAPEUTIC EXERCISES: CPT | Performed by: PHYSICAL THERAPIST

## 2025-03-04 NOTE — PROGRESS NOTES
PT RE-EVALUATION    Today's date: 25  Patient name: Sailaja Stubbs  : 1955  MRN: [id number]  Referring provider: Van Turner MD  Dx:   1. Chronic pain of left knee    2. Total knee replacement status, left              ASSESSMENT:   Sailaja Stubbs is a 69 y.o. female who is s/p Left TKA due to chronic pain.  Upon evaluation, patient demonstrates good improvements with knee ROM, strength, and overall pain. Patient is ambulating without an AD independently with minimal gait deficits. Patient AROM is near normal with mild limitation in extension. Patient has met all of her short term goals and is progressing appropriately toward long term goals.  Patient scored a 77 on FOTO indicating improvements with overall function. Patient would benefit from continued skilled physical therapy to address the impairments, improve their level of function, and to improve their overall quality of life.        Impairments:    restricted ROM    decreased strength   pain with function   activity intolerance   weight bearing intolerance   abnormal gait     Prognosis:  Good  Positive and negative prognostic indicator(s):  pain >3 months and multiple comorbidities    Goals:     Short Term Goals: to be achieved by 4 weeks  1) Patient to be independent with basic HEP. MET  2) Decrease pain to 5/10 at its worst.  MET  3) Increase knee ROM by 5-10 degrees  MET  4) Increase LE strength by 1/2 MMT grade in all deficient planes.  MET    Long Term Goals: to be achieved by discharge  1) FOTO equal to or greater than target score indicating improvements with overall function. Progressing   2) Ambulation to improve to maximal level of function Progressing  3) Stair negotiation will improve to reciprocal. Progressing  4) Sit to stand transfers will improve to maximal level of function Progressing      Planned interventions:  home exercise program, patient education, manual therapy, graded activity, flexibility, functional range of motion  exercises, strengthening, abdominal trunk stabilization, balance and weight bearing training, gait training, and modalities prn    Duration in visits:  8  Frequency: 2 visits per week  Duration in weeks:  4    History of Current Injury: Patient reports good improvements with ambulation and functional daily tasks. Patient notes that she no longer has pain but more of an achiness and stiffness at times. Patient notes that she is going up the steps reciprocally.    Pain location: joint line global   Pain description: dull ache   Pain at worst: 2/10 (FROM 5-6/10)   Pain at best: 1-2/10       Aggravating factors: after using the bike   Easing factors: game ready at home for icing, rest     Imaging: see imaging tab   Special Questions: Has BLE edema from varicose veins. Denies numbness of tingling in the legs and feet.     HOME SET-UP  Social support: lives with  that will be able to help.   Home Type: 2 story home  MAYKEL: through garage you walk in on ground level and go up full flight of stairs to main level. Wide stair case with one railing.   Steps Inside: full flight of stairs to master bedroom. There is a spare room on first floor with bathroom that she will be able to use.   Shower: full bathroom on first floor with walk in shower. Grab bars and shower bench.   Toilets: raised toilet   Assisted devices available at home: , rollator, will be getting regular walker       Hobbies/Interests: embroidery, reading books, going for walks  Occupation: retired  at [employer]. Now does part time alterations for dry .   Patient goals: Patient reports goals for physical therapy would be decreased pain, increased mobility, increased strength, and return to recreation  return to recreational walking program       Objective     Observations   Left Knee   Positive for incision.     Additional Observation Details  Incision intact and healed.    Neurological Testing     Sensation     Knee   Left Knee    Intact: Light touch    Right Knee   Intact: light touch     Active Range of Motion   Left Knee   Flexion: 120 degrees   Extension: 1 degrees     Additional Active Range of Motion Details  Pain with over pressure     Mobility   Patellar Mobility:   Left Knee   WFL: medial, lateral, superior and inferior.     Strength/Myotome Testing     Left Knee   Flexion: 4  Extension: 4+  Quadriceps contraction: good    Additional Strength Details  Proper superior patellar translation with isolated contraction.     Ambulation   Weight-Bearing Status   Weight-Bearing Status (Left): weight-bearing as tolerated   Assistive device used: none    Ambulation: Level Surfaces   Ambulation without assistive device: independent    Ambulation: Stairs   Ascend stairs: independent  Pattern: reciprocal  Railings: without rails  Descend stairs: independent  Pattern: non-reciprocal  Railings: one rail    Observational Gait   Gait: antalgic   Stride length, right stance time, right swing time and right step length within functional limits. Decreased walking speed, left stance time, left swing time and left step length.   Left foot contact pattern: heel to toe  Right foot contact pattern: heel to toe  Left arm swing: within functional limits  Right arm swing: within functional limits  Base of support: normal    Comments   TUG: 10 seconds (FROM 23 seconds with RW. UE for support)     Functional Assessment        Comments  Functional sit <>stand: able to perform without UE support   5xSTS:12 seconds without UE  (FROM 13 seconds with UE support)           Precautions: hearing loss (increased         Manuals 2/4 2/7 2/11 2/14 2/18 2/21 2/25 2/28 3/4    Manual knee stretching (FLEX AND EXT) ACP ACP ACP  ACP ACP ACP  ACP  ACP     Patellar mobs  ACP ACP ACP           tibiofemoral joint mobs                          Neuro Re-Ed             Quad set with strap  :05x2' :05x2' No strap   :05x10  No strap   :05x10  No strap   :05x10         Standing cross  "legged extension stretch         :10x5 (right leg crossed over left pushing left knee into extension)                                Ther Ex             SAQ  2x10 2x10  2x10           LAQ     2x10  3x10        Knee flexion/extension machine        Leg ext/flex machine     11# BLE   10x ea  Leg ext/flex machine     11# BLE   2x10  ea      3-way hip  3x10 ea LLE only  3x10 ea LLE only  3x10 ea LLE only  10x BLE this date  2x10 BLE  2x10 BLE 2x10 BLE  2#      Heel slides supine or seated  Seated and supine :10x2' Seated and supine :10x2' Seated and supine :10x2 Seated  :10x2 Seated  and supine :10x2 Pball roll ins     :10x2'       Gastro stretch SB    :30x2  :30x2   :30x2   :30x2      Retrowalking at // bar    6x  4x full / bar  4x full / bar         Heel raises  3x10  3x10  3x10  3x10 3x10  3x10       Standing hamstring curls  2x10  2x10 2x10  2x10 2x10 2x10        Sitting hamstring stretch on stool       :20x3        Leg press         BLE 2x10 30#      NuStep-->BIke Lvl 2 5 mins  Lvl 2 5 mins Lvl 2 5 mins Lvl 2 5 mins Lvl 2 5 mins Lvl 2 6 mins Recumbent bike 6 mins L1 Recumbent bike 6 mins L1     Ther Activity             Step downs      6\" 2x10  6\" 2x10  6\" 2x10  8\" 2x10     Mini squats     2x10          Step ups  4\" LLE only 2x10  4\" LLE only 2x10  4\" LLE only 2x10  4\" LLE only 2x10  6\" LLE only 2x10  6\" 2x10  8\" 2x10 8\" 2x10     Modified lunges to bolster        2x10 LLE  2x10 LLE     Stairs in clinic       Reciprocal ascending and descending   2x ea        Gait Training             Hurdles      Reciprocal stepping 4 hurdles   6x  Reciprocal stepping 4 hurdles   6x                     Modalities                                              "

## 2025-03-04 NOTE — LETTER
2025    Van Turner MD  [address] [facility] [address]    Patient: Sailaja Stubbs   YOB: 1955   Date of Visit: 3/4/2025     Encounter Diagnosis     ICD-10-CM    1. Chronic pain of left knee  M25.562     G89.29       2. Total knee replacement status, left  Z96.652           Dear Dr. Turner:    Thank you for your recent referral of Sailaja Stubbs. Please review the attached evaluation summary from Sailaja's recent visit.     Please verify that you agree with the plan of care by signing the attached order.     If you have any questions or concerns, please do not hesitate to call.     I sincerely appreciate the opportunity to share in the care of one of your patients and hope to have another opportunity to work with you in the near future.       Sincerely,    Dian Pagan, PT      Referring Provider:      I certify that I have read the below Plan of Care and certify the need for these services furnished under this plan of treatment while under my care.                    Van Turner MD  [address] [facility] [address]  Via Fax: [phone]          PT RE-EVALUATION    Today's date: 25  Patient name: Sailaja Stubbs  : 1955  MRN: [id number]  Referring provider: Van Turner MD  Dx:   1. Chronic pain of left knee    2. Total knee replacement status, left              ASSESSMENT:   Sailaja Stubbs is a 69 y.o. female who is s/p Left TKA due to chronic pain.  Upon evaluation, patient demonstrates good improvements with knee ROM, strength, and overall pain. Patient is ambulating without an AD independently with minimal gait deficits. Patient AROM is near normal with mild limitation in extension. Patient has met all of her short term goals and is progressing appropriately toward long term goals.  Patient scored a 77 on FOTO indicating improvements with overall function. Patient would benefit from continued skilled physical therapy to address the impairments, improve  their level of function, and to improve their overall quality of life.        Impairments:    restricted ROM    decreased strength   pain with function   activity intolerance   weight bearing intolerance   abnormal gait     Prognosis:  Good  Positive and negative prognostic indicator(s):  pain >3 months and multiple comorbidities    Goals:     Short Term Goals: to be achieved by 4 weeks  1) Patient to be independent with basic HEP. MET  2) Decrease pain to 5/10 at its worst.  MET  3) Increase knee ROM by 5-10 degrees  MET  4) Increase LE strength by 1/2 MMT grade in all deficient planes.  MET    Long Term Goals: to be achieved by discharge  1) FOTO equal to or greater than target score indicating improvements with overall function. Progressing   2) Ambulation to improve to maximal level of function Progressing  3) Stair negotiation will improve to reciprocal. Progressing  4) Sit to stand transfers will improve to maximal level of function Progressing      Planned interventions:  home exercise program, patient education, manual therapy, graded activity, flexibility, functional range of motion exercises, strengthening, abdominal trunk stabilization, balance and weight bearing training, gait training, and modalities prn    Duration in visits:  8  Frequency: 2 visits per week  Duration in weeks:  4    History of Current Injury: Patient reports good improvements with ambulation and functional daily tasks. Patient notes that she no longer has pain but more of an achiness and stiffness at times. Patient notes that she is going up the steps reciprocally.    Pain location: joint line global   Pain description: dull ache   Pain at worst: 2/10 (FROM 5-6/10)   Pain at best: 1-2/10       Aggravating factors: after using the bike   Easing factors: game ready at home for icing, rest     Imaging: see imaging tab   Special Questions: Has BLE edema from varicose veins. Denies numbness of tingling in the legs and feet.     HOME  SET-UP  Social support: lives with  that will be able to help.   Home Type: 2 story home  MAYKEL: through garage you walk in on ground level and go up full flight of stairs to main level. Wide stair case with one railing.   Steps Inside: full flight of stairs to master bedroom. There is a spare room on first floor with bathroom that she will be able to use.   Shower: full bathroom on first floor with walk in shower. Grab bars and shower bench.   Toilets: raised toilet   Assisted devices available at home: SPC, rollator, will be getting regular walker       Hobbies/Interests: embroidery, reading books, going for walks  Occupation: retired  at [employer]. Now does part time alterations for dry .   Patient goals: Patient reports goals for physical therapy would be decreased pain, increased mobility, increased strength, and return to recreation  return to recreational walking program       Objective     Observations   Left Knee   Positive for incision.     Additional Observation Details  Incision intact and healed.    Neurological Testing     Sensation     Knee   Left Knee   Intact: Light touch    Right Knee   Intact: light touch     Active Range of Motion   Left Knee   Flexion: 120 degrees   Extension: 1 degrees     Additional Active Range of Motion Details  Pain with over pressure     Mobility   Patellar Mobility:   Left Knee   WFL: medial, lateral, superior and inferior.     Strength/Myotome Testing     Left Knee   Flexion: 4  Extension: 4+  Quadriceps contraction: good    Additional Strength Details  Proper superior patellar translation with isolated contraction.     Ambulation   Weight-Bearing Status   Weight-Bearing Status (Left): weight-bearing as tolerated   Assistive device used: none    Ambulation: Level Surfaces   Ambulation without assistive device: independent    Ambulation: Stairs   Ascend stairs: independent  Pattern: reciprocal  Railings: without rails  Descend stairs:  independent  Pattern: non-reciprocal  Railings: one rail    Observational Gait   Gait: antalgic   Stride length, right stance time, right swing time and right step length within functional limits. Decreased walking speed, left stance time, left swing time and left step length.   Left foot contact pattern: heel to toe  Right foot contact pattern: heel to toe  Left arm swing: within functional limits  Right arm swing: within functional limits  Base of support: normal    Comments   TUG: 10 seconds (FROM 23 seconds with RW. UE for support)     Functional Assessment        Comments  Functional sit <>stand: able to perform without UE support   5xSTS:12 seconds without UE  (FROM 13 seconds with UE support)           Precautions: hearing loss (increased         Manuals 2/4 2/7 2/11 2/14 2/18 2/21 2/25 2/28 3/4    Manual knee stretching (FLEX AND EXT) ACP ACP ACP  ACP ACP ACP  ACP  ACP     Patellar mobs  ACP ACP ACP           tibiofemoral joint mobs                          Neuro Re-Ed             Quad set with strap  :05x2' :05x2' No strap   :05x10  No strap   :05x10  No strap   :05x10         Standing cross legged extension stretch         :10x5 (right leg crossed over left pushing left knee into extension)                                Ther Ex             SAQ  2x10 2x10  2x10           LAQ     2x10  3x10        Knee flexion/extension machine        Leg ext/flex machine     11# BLE   10x ea  Leg ext/flex machine     11# BLE   2x10  ea      3-way hip  3x10 ea LLE only  3x10 ea LLE only  3x10 ea LLE only  10x BLE this date  2x10 BLE  2x10 BLE 2x10 BLE  2#      Heel slides supine or seated  Seated and supine :10x2' Seated and supine :10x2' Seated and supine :10x2 Seated  :10x2 Seated  and supine :10x2 Pball roll ins     :10x2'       Gastro stretch SB    :30x2  :30x2   :30x2   :30x2      Retrowalking at // bar    6x  4x full / bar  4x full / bar         Heel raises  3x10  3x10  3x10  3x10 3x10  3x10       Standing hamstring  "curls  2x10  2x10 2x10  2x10 2x10 2x10        Sitting hamstring stretch on stool       :20x3        Leg press         BLE 2x10 30#      NuStep-->BIke Lvl 2 5 mins  Lvl 2 5 mins Lvl 2 5 mins Lvl 2 5 mins Lvl 2 5 mins Lvl 2 6 mins Recumbent bike 6 mins L1 Recumbent bike 6 mins L1     Ther Activity             Step downs      6\" 2x10  6\" 2x10  6\" 2x10  8\" 2x10     Mini squats     2x10          Step ups  4\" LLE only 2x10  4\" LLE only 2x10  4\" LLE only 2x10  4\" LLE only 2x10  6\" LLE only 2x10  6\" 2x10  8\" 2x10 8\" 2x10     Modified lunges to bolster        2x10 LLE  2x10 LLE     Stairs in clinic       Reciprocal ascending and descending   2x ea        Gait Training             Hurdles      Reciprocal stepping 4 hurdles   6x  Reciprocal stepping 4 hurdles   6x                     Modalities                                                              "

## 2025-03-07 ENCOUNTER — OFFICE VISIT (OUTPATIENT)
Dept: PHYSICAL THERAPY | Facility: CLINIC | Age: 70
End: 2025-03-07
Payer: MEDICARE

## 2025-03-07 DIAGNOSIS — M25.562 CHRONIC PAIN OF LEFT KNEE: Primary | ICD-10-CM

## 2025-03-07 DIAGNOSIS — G89.29 CHRONIC PAIN OF LEFT KNEE: Primary | ICD-10-CM

## 2025-03-07 DIAGNOSIS — Z96.652 TOTAL KNEE REPLACEMENT STATUS, LEFT: ICD-10-CM

## 2025-03-07 PROCEDURE — 97530 THERAPEUTIC ACTIVITIES: CPT | Performed by: PHYSICAL THERAPIST

## 2025-03-07 PROCEDURE — 97110 THERAPEUTIC EXERCISES: CPT | Performed by: PHYSICAL THERAPIST

## 2025-03-07 PROCEDURE — 97116 GAIT TRAINING THERAPY: CPT | Performed by: PHYSICAL THERAPIST

## 2025-03-07 NOTE — PROGRESS NOTES
Daily Note     Today's date: 3/7/2025  Patient name: Sailaja Stubbs  : 1955  MRN: [id number]  Referring provider: Van Turner MD  Dx:   Encounter Diagnosis     ICD-10-CM    1. Chronic pain of left knee  M25.562     G89.29       2. Total knee replacement status, left  Z96.652             Start Time: 1445  Stop Time: 1530  Total time in clinic (min): 45 minutes    Subjective: Patient reports she finds herself doing the stairs more normally now.       Objective: See treatment diary below       Assessment: Tolerated treatment well. Continued with established POC. No adverse effects to new exercises this date. Progressing well with isolating strength exercises. Will continue to progress as able. Patient would benefit from continued PT      Plan: Continue per plan of care.      Precautions: hearing loss (increased volume)      Manuals 2/4 2/7 2/11 2/14 2/18 2/21 2/25 2/28 3/7    Manual knee stretching (FLEX AND EXT) ACP ACP ACP  ACP ACP ACP  ACP  ACP     Patellar mobs  ACP ACP ACP       Superior patellar mobs     ACP    tibiofemoral joint mobs                          Neuro Re-Ed             Quad set with strap  :05x2' :05x2' No strap   :05x10  No strap   :05x10  No strap   :05x10         Standing cross legged extension stretch         :10x5 (right leg crossed over left pushing left knee into extension)  :10x5 (right leg crossed over left pushing left knee into extension)     SLR          1x10  1x5                  Ther Ex             SAQ  2x10 2x10  2x10           LAQ     2x10  3x10    :05x2'    Knee flexion/extension machine        Leg ext/flex machine     11# BLE   10x ea  Leg ext/flex machine     11# BLE   2x10  ea  Leg ext/flex machine     11# BLE   2x10  ea     3-way hip  3x10 ea LLE only  3x10 ea LLE only  3x10 ea LLE only  10x BLE this date  2x10 BLE  2x10 BLE 2x10 BLE  2#      Heel slides supine or seated  Seated and supine :10x2' Seated and supine :10x2' Seated and supine :10x2 Seated  :10x2 Seated  and  "supine :10x2 Pball roll ins     :10x2'   Pball roll ins     :10x2'    Gastro stretch SB    :30x2  :30x2   :30x2   :30x2      Retrowalking at // bar    6x  4x full / bar  4x full / bar         Heel raises  3x10  3x10  3x10  3x10 3x10  3x10       Standing hamstring curls  2x10  2x10 2x10  2x10 2x10 2x10        Sitting hamstring stretch on stool       :20x3        Leg press         BLE 2x10 30#  BLE 2x10 30#     NuStep-->BIke Lvl 2 5 mins  Lvl 2 5 mins Lvl 2 5 mins Lvl 2 5 mins Lvl 2 5 mins Lvl 2 6 mins Recumbent bike 6 mins L1 Recumbent bike 6 mins L1 Recumbent bike 6 mins L1    Ther Activity             Step downs      6\" 2x10  6\" 2x10  6\" 2x10  8\" 2x10 8\" 2x10    Mini squats     2x10          Step ups  4\" LLE only 2x10  4\" LLE only 2x10  4\" LLE only 2x10  4\" LLE only 2x10  6\" LLE only 2x10  6\" 2x10  8\" 2x10 8\" 2x10 8\" 2x10    Modified lunges to bolster        2x10 LLE  2x10 LLE 2x10 LLE    TRX squats              Stairs in clinic       Reciprocal ascending and descending   2x ea        Gait Training             Hurdles      Reciprocal stepping 4 hurdles   6x  Reciprocal stepping 4 hurdles   6x                     Modalities                                                "

## 2025-03-11 ENCOUNTER — OFFICE VISIT (OUTPATIENT)
Dept: PHYSICAL THERAPY | Facility: CLINIC | Age: 70
End: 2025-03-11
Payer: MEDICARE

## 2025-03-11 DIAGNOSIS — G89.29 CHRONIC PAIN OF LEFT KNEE: Primary | ICD-10-CM

## 2025-03-11 DIAGNOSIS — Z96.652 TOTAL KNEE REPLACEMENT STATUS, LEFT: ICD-10-CM

## 2025-03-11 DIAGNOSIS — M25.562 CHRONIC PAIN OF LEFT KNEE: Primary | ICD-10-CM

## 2025-03-11 PROCEDURE — 97530 THERAPEUTIC ACTIVITIES: CPT | Performed by: PHYSICAL THERAPIST

## 2025-03-11 PROCEDURE — 97112 NEUROMUSCULAR REEDUCATION: CPT | Performed by: PHYSICAL THERAPIST

## 2025-03-11 PROCEDURE — 97110 THERAPEUTIC EXERCISES: CPT | Performed by: PHYSICAL THERAPIST

## 2025-03-11 NOTE — PROGRESS NOTES
Daily Note     Today's date: 3/11/2025  Patient name: Sailaja Stubbs  : 1955  MRN: [id number]  Referring provider: Van Turner MD  Dx:   Encounter Diagnosis     ICD-10-CM    1. Chronic pain of left knee  M25.562     G89.29       2. 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       Objective: See treatment diary below       Assessment: Tolerated treatment well. Continued with established POC. No adverse effects to progressions in reps this date.  Will continue to progress as able. Patient would benefit from continued PT      Plan: Continue per plan of care.      Precautions: hearing loss (increased volume)      Manuals 2/4 2/7 2/11 2/14 2/18 2/21 2/25 2/28 3/7 3/10   Manual knee stretching (FLEX AND EXT) ACP ACP ACP  ACP ACP ACP  ACP  ACP     Patellar mobs  ACP ACP ACP       Superior patellar mobs     ACP Superior patellar mobs     ACP   tibiofemoral joint mobs                          Neuro Re-Ed             Quad set with strap  :05x2' :05x2' No strap   :05x10  No strap   :05x10  No strap   :05x10         Standing cross legged extension stretch         :10x5 (right leg crossed over left pushing left knee into extension)  :10x5 (right leg crossed over left pushing left knee into extension)  :10x5 (right leg crossed over left pushing left knee into extension)    SLR          1x10  1x5  1x10  1x5                 Ther Ex             SAQ  2x10 2x10  2x10       :05x2' :05x2'   LAQ     2x10  3x10        Knee flexion/extension machine        Leg ext/flex machine     11# BLE   10x ea  Leg ext/flex machine     11# BLE   2x10  ea  Leg ext/flex machine     11# BLE   2x10  ea  Leg ext/flex machine     11# BLE   3x10  ea    3-way hip  3x10 ea LLE only  3x10 ea LLE  "only  3x10 ea LLE only  10x BLE this date  2x10 BLE  2x10 BLE 2x10 BLE  2#      Heel slides supine or seated  Seated and supine :10x2' Seated and supine :10x2' Seated and supine :10x2 Seated  :10x2 Seated  and supine :10x2 Pball roll ins     :10x2'   Pball roll ins     :10x2' Pball roll ins     :10x2'   Gastro stretch SB    :30x2  :30x2   :30x2   :30x2      Retrowalking at // bar    6x  4x full / bar  4x full / bar         Heel raises  3x10  3x10  3x10  3x10 3x10  3x10       Standing hamstring curls  2x10  2x10 2x10  2x10 2x10 2x10        Sitting hamstring stretch on stool       :20x3        Leg press         BLE 2x10 30#  BLE 2x10 30#  BLE 3x10 30#    NuStep-->BIke Lvl 2 5 mins  Lvl 2 5 mins Lvl 2 5 mins Lvl 2 5 mins Lvl 2 5 mins Lvl 2 6 mins Recumbent bike 6 mins L1 Recumbent bike 6 mins L1 Recumbent bike 6 mins L1 Recumbent bike 6 mins L2   Ther Activity             Step downs      6\" 2x10  6\" 2x10  6\" 2x10  8\" 2x10 8\" 2x10 8\" 2x10   Mini squats     2x10          Step ups  4\" LLE only 2x10  4\" LLE only 2x10  4\" LLE only 2x10  4\" LLE only 2x10  6\" LLE only 2x10  6\" 2x10  8\" 2x10 8\" 2x10 8\" 2x10 8\" 2x10   Modified lunges to bolster        2x10 LLE  2x10 LLE 2x10 LLE 3x10 LLE   TRX squats              Stairs in clinic       Reciprocal ascending and descending   2x ea        Gait Training             Hurdles      Reciprocal stepping 4 hurdles   6x  Reciprocal stepping 4 hurdles   6x                     Modalities                                                "

## 2025-03-13 ENCOUNTER — OFFICE VISIT (OUTPATIENT)
Dept: PHYSICAL THERAPY | Facility: CLINIC | Age: 70
End: 2025-03-13
Payer: MEDICARE

## 2025-03-13 DIAGNOSIS — G89.29 CHRONIC PAIN OF LEFT KNEE: Primary | ICD-10-CM

## 2025-03-13 DIAGNOSIS — M25.562 CHRONIC PAIN OF LEFT KNEE: Primary | ICD-10-CM

## 2025-03-13 DIAGNOSIS — Z96.652 TOTAL KNEE REPLACEMENT STATUS, LEFT: ICD-10-CM

## 2025-03-13 PROCEDURE — 97110 THERAPEUTIC EXERCISES: CPT | Performed by: PHYSICAL THERAPIST

## 2025-03-13 PROCEDURE — 97530 THERAPEUTIC ACTIVITIES: CPT | Performed by: PHYSICAL THERAPIST

## 2025-03-13 PROCEDURE — 97112 NEUROMUSCULAR REEDUCATION: CPT | Performed by: PHYSICAL THERAPIST

## 2025-03-13 NOTE — PROGRESS NOTES
Daily Note     Today's date: 3/13/2025  Patient name: Sailaja Stubbs  : 1955  MRN: [id number]  Referring provider: Van Turner MD  Dx:   Encounter Diagnosis     ICD-10-CM    1. Chronic pain of left knee  M25.562     G89.29       2. Total knee replacement status, left  Z96.652                 Start Time: 730  Stop Time: 815  Total time in clinic (min): 45 minutes    Subjective: Patient reports she is doing well with no new changes since last session.       Objective: See treatment diary below       Assessment: Tolerated treatment well. Continued with established POC. Positive response to progressions and new exercises this date. Patient able to get to 0 extension at end of session. Will continue to progress as able. Patient would benefit from continued PT      Plan: Continue per plan of care.      Precautions: hearing loss (increased volume)      Manuals 3/13 2/7 2/11 2/14 2/18 2/21 2/25 2/28 3/7 3/10   Manual knee stretching (FLEX AND EXT) ACP ACP ACP  ACP ACP ACP  ACP  ACP     Patellar mobs  ACP ACP ACP       Superior patellar mobs     ACP Superior patellar mobs     ACP   tibiofemoral joint mobs                          Neuro Re-Ed             Quad set with strap   :05x2' No strap   :05x10  No strap   :05x10  No strap   :05x10         Standing cross legged extension stretch  :10x5 (right leg crossed over left pushing left knee into extension)        :10x5 (right leg crossed over left pushing left knee into extension)  :10x5 (right leg crossed over left pushing left knee into extension)  :10x5 (right leg crossed over left pushing left knee into extension)    SLR  2x10         1x10  1x5  1x10  1x5                 Ther Ex             SAQ 2#   :05x2' 2x10 2x10  2x10       :05x2' :05x2'   LAQ     2x10  3x10        Knee flexion/extension machine  Leg ext/flex machine     11# BLE   3x10  ea  Increase # NV     Leg ext/flex machine     11# BLE   10x ea  Leg ext/flex machine     11# BLE   2x10  ea  Leg ext/flex  "machine     11# BLE   2x10  ea  Leg ext/flex machine     11# BLE   3x10  ea    3-way hip   3x10 ea LLE only  3x10 ea LLE only  10x BLE this date  2x10 BLE  2x10 BLE 2x10 BLE  2#      Heel slides supine or seated   Seated and supine :10x2' Seated and supine :10x2 Seated  :10x2 Seated  and supine :10x2 Pball roll ins     :10x2'   Pball roll ins     :10x2' Pball roll ins     :10x2'   Gastro stretch SB    :30x2  :30x2   :30x2   :30x2      Retrowalking at // bar    6x  4x full / bar  4x full / bar         Heel raises   3x10  3x10  3x10 3x10  3x10       Standing hamstring curls   2x10 2x10  2x10 2x10 2x10        Sitting hamstring stretch on stool       :20x3        Leg press  BLE 40# 3x10        BLE 2x10 30#  BLE 2x10 30#  BLE 3x10 30#    NuStep-->BIke Recumbent bike 6 mins L2 Lvl 2 5 mins Lvl 2 5 mins Lvl 2 5 mins Lvl 2 5 mins Lvl 2 6 mins Recumbent bike 6 mins L1 Recumbent bike 6 mins L1 Recumbent bike 6 mins L1 Recumbent bike 6 mins L2   Ther Activity             Sit to stands  7.5# 2x10             Step downs  8\" 2x10    6\" 2x10  6\" 2x10  6\" 2x10  8\" 2x10 8\" 2x10 8\" 2x10   Mini squats     2x10          Step ups  8\" 3x10 4\" LLE only 2x10  4\" LLE only 2x10  4\" LLE only 2x10  6\" LLE only 2x10  6\" 2x10  8\" 2x10 8\" 2x10 8\" 2x10 8\" 2x10   Modified lunges to bolster  3x10 LLE      2x10 LLE  2x10 LLE 2x10 LLE 3x10 LLE   TRX squats  NV            Stairs in clinic       Reciprocal ascending and descending   2x ea        Gait Training             Hurdles      Reciprocal stepping 4 hurdles   6x  Reciprocal stepping 4 hurdles   6x                     Modalities                                                "

## 2025-03-18 ENCOUNTER — OFFICE VISIT (OUTPATIENT)
Dept: PHYSICAL THERAPY | Facility: CLINIC | Age: 70
End: 2025-03-18
Payer: MEDICARE

## 2025-03-18 DIAGNOSIS — Z96.652 TOTAL KNEE REPLACEMENT STATUS, LEFT: Primary | ICD-10-CM

## 2025-03-18 DIAGNOSIS — M25.562 CHRONIC PAIN OF LEFT KNEE: ICD-10-CM

## 2025-03-18 DIAGNOSIS — G89.29 CHRONIC PAIN OF LEFT KNEE: ICD-10-CM

## 2025-03-18 PROCEDURE — 97110 THERAPEUTIC EXERCISES: CPT | Performed by: PHYSICAL THERAPIST

## 2025-03-18 PROCEDURE — 97112 NEUROMUSCULAR REEDUCATION: CPT | Performed by: PHYSICAL THERAPIST

## 2025-03-18 PROCEDURE — 97530 THERAPEUTIC ACTIVITIES: CPT | Performed by: PHYSICAL THERAPIST

## 2025-03-18 NOTE — PROGRESS NOTES
Daily Note     Today's date: 3/18/2025  Patient name: Sailaja Stubbs  : 1955  MRN: [id number]  Referring provider: Van Turner MD  Dx:   Encounter Diagnosis     ICD-10-CM    1. Total knee replacement status, left  Z96.652       2. Chronic pain of left knee  M25.562     G89.29                   Start Time: 1030  Stop Time: 1115  Total time in clinic (min): 45 minutes    Subjective: Patient reports the knee is improving but continues to have some stiffness.       Objective: See treatment diary below       Assessment: Tolerated treatment well. Continued with established POC. Patient challenged appropriately with with progressions this date. No pain throughout. Will continue to progress as able. Patient would benefit from continued PT      Plan: Continue per plan of care.      Precautions: hearing loss (increased volume)      Manuals 3/13 3/18 2/11 2/14 2/18 2/21 2/25 2/28 3/7 3/10   Manual knee stretching (FLEX AND EXT) ACP ACP ACP  ACP ACP ACP  ACP  ACP     Patellar mobs  ACP ACP ACP       Superior patellar mobs     ACP Superior patellar mobs     ACP   tibiofemoral joint mobs                          Neuro Re-Ed             Quad set with strap    No strap   :05x10  No strap   :05x10  No strap   :05x10         Standing cross legged extension stretch  :10x5 (right leg crossed over left pushing left knee into extension)        :10x5 (right leg crossed over left pushing left knee into extension)  :10x5 (right leg crossed over left pushing left knee into extension)  :10x5 (right leg crossed over left pushing left knee into extension)    SLR  2x10  2x10       1x10  1x5  1x10  1x5                 Ther Ex             SAQ 2#   :05x2' 2#   :05x2' 2x10  2x10       :05x2' :05x2'   LAQ     2x10  3x10        Knee flexion/extension machine  Leg ext/flex machine     11# BLE   3x10  ea  Leg ext/flex machine     Eccentric focus     11# 2x10 ea      Leg ext/flex machine     11# BLE   10x ea  Leg ext/flex machine     11# BLE   2x10   "ea  Leg ext/flex machine     11# BLE   2x10  ea  Leg ext/flex machine     11# BLE   3x10  ea    3-way hip    3x10 ea LLE only  10x BLE this date  2x10 BLE  2x10 BLE 2x10 BLE  2#      Heel slides supine or seated    Seated and supine :10x2 Seated  :10x2 Seated  and supine :10x2 Pball roll ins     :10x2'   Pball roll ins     :10x2' Pball roll ins     :10x2'   Gastro stretch SB    :30x2  :30x2   :30x2   :30x2      Retrowalking at // bar    6x  4x full / bar  4x full / bar         Heel raises    3x10  3x10 3x10  3x10       Standing hamstring curls    2x10  2x10 2x10 2x10        Sitting hamstring stretch on stool       :20x3        Leg press  BLE 40# 3x10  SL 20# 3x10       BLE 2x10 30#  BLE 2x10 30#  BLE 3x10 30#    NuStep-->BIke Recumbent bike 6 mins L2 Recumbent bike 6 mins L2 Lvl 2 5 mins Lvl 2 5 mins Lvl 2 5 mins Lvl 2 6 mins Recumbent bike 6 mins L1 Recumbent bike 6 mins L1 Recumbent bike 6 mins L1 Recumbent bike 6 mins L2   Ther Activity             Sit to stands  7.5# 2x10  7.5# 2x10            Step downs  8\" 2x10 8\" 3x10   6\" 2x10  6\" 2x10  6\" 2x10  8\" 2x10 8\" 2x10 8\" 2x10   Mini squats     2x10          Step ups  8\" 3x10 8\" 3x10 4\" LLE only 2x10  4\" LLE only 2x10  6\" LLE only 2x10  6\" 2x10  8\" 2x10 8\" 2x10 8\" 2x10 8\" 2x10   Modified lunges to bolster  3x10 LLE 3x10 LLE     2x10 LLE  2x10 LLE 2x10 LLE 3x10 LLE   TRX squats  NV            Stairs in clinic       Reciprocal ascending and descending   2x ea        Gait Training             Hurdles      Reciprocal stepping 4 hurdles   6x  Reciprocal stepping 4 hurdles   6x                     Modalities                                                "

## 2025-03-21 ENCOUNTER — OFFICE VISIT (OUTPATIENT)
Dept: PHYSICAL THERAPY | Facility: CLINIC | Age: 70
End: 2025-03-21
Payer: MEDICARE

## 2025-03-21 DIAGNOSIS — M25.562 CHRONIC PAIN OF LEFT KNEE: ICD-10-CM

## 2025-03-21 DIAGNOSIS — G89.29 CHRONIC PAIN OF LEFT KNEE: ICD-10-CM

## 2025-03-21 DIAGNOSIS — Z96.652 TOTAL KNEE REPLACEMENT STATUS, LEFT: Primary | ICD-10-CM

## 2025-03-21 PROCEDURE — 97112 NEUROMUSCULAR REEDUCATION: CPT | Performed by: PHYSICAL THERAPIST

## 2025-03-21 PROCEDURE — 97530 THERAPEUTIC ACTIVITIES: CPT | Performed by: PHYSICAL THERAPIST

## 2025-03-21 PROCEDURE — 97110 THERAPEUTIC EXERCISES: CPT | Performed by: PHYSICAL THERAPIST

## 2025-03-21 NOTE — PROGRESS NOTES
Daily Note     Today's date: 3/21/2025  Patient name: Sailaja Stubbs  : 1955  MRN: [id number]  Referring provider: Van Turner MD  Dx:   Encounter Diagnosis     ICD-10-CM    1. Total knee replacement status, left  Z96.652       2. Chronic pain of left knee  M25.562     G89.29                     Start Time: 0845  Stop Time: 930  Total time in clinic (min): 45 minutes    Subjective: Patient reports no new changes this date.       Objective: See treatment diary below       Assessment: Tolerated treatment well. Continued with established POC. Patient challenged appropriately with with progressions this date. No pain throughout. Will continue to progress as able. Patient would benefit from continued PT      Plan: Continue per plan of care.      Precautions: hearing loss (increased volume)      Manuals 3/13 3/18 3/21   2/21 2/25 2/28 3/7 3/10   Manual knee stretching (FLEX AND EXT) ACP ACP    ACP  ACP  ACP     Patellar mobs  ACP ACP       Superior patellar mobs     ACP Superior patellar mobs     ACP   tibiofemoral joint mobs                          Neuro Re-Ed             Quad set with strap              Standing cross legged extension stretch  :10x5 (right leg crossed over left pushing left knee into extension)        :10x5 (right leg crossed over left pushing left knee into extension)  :10x5 (right leg crossed over left pushing left knee into extension)  :10x5 (right leg crossed over left pushing left knee into extension)    SLR  2x10  2x10 2x10       1x10  1x5  1x10  1x5                 Ther Ex             SAQ 2#   :05x2' 2#   :05x2' 2#   :05x2'      :05x2' :05x2'   LAQ      3x10        Knee flexion/extension machine  Leg ext/flex machine     11# BLE   3x10  ea  Leg ext/flex machine     Eccentric focus     11# 2x10 ea  Leg ext/flex machine     Eccentric focus     11# 2x10 ea     Leg ext/flex machine     11# BLE   10x ea  Leg ext/flex machine     11# BLE   2x10  ea  Leg ext/flex machine     11# BLE   2x10  ea   "Leg ext/flex machine     11# BLE   3x10  ea    3-way hip       2x10 BLE 2x10 BLE  2#      Heel slides supine or seated       Pball roll ins     :10x2'   Pball roll ins     :10x2' Pball roll ins     :10x2'   Gastro stretch SB       :30x2   :30x2      Retrowalking at // bar              Heel raises       3x10       Standing hamstring curls       2x10        Sitting hamstring stretch on stool       :20x3        Leg press  BLE 40# 3x10  SL 20# 3x10  SL 20# 3x10     BLE 2x10 30#  BLE 2x10 30#  BLE 3x10 30#    NuStep-->BIke Recumbent bike 6 mins L2 Recumbent bike 6 mins L2 Recumbent bike 6 mins L2   Lvl 2 6 mins Recumbent bike 6 mins L1 Recumbent bike 6 mins L1 Recumbent bike 6 mins L1 Recumbent bike 6 mins L2   Ther Activity             Sit to stands  7.5# 2x10  7.5# 2x10  7.5# 2x10           Step downs  8\" 2x10 8\" 3x10 8\" 3x10   6\" 2x10  6\" 2x10  8\" 2x10 8\" 2x10 8\" 2x10   Mini squats              Step ups  8\" 3x10 8\" 3x10 8\" 3x10   6\" 2x10  8\" 2x10 8\" 2x10 8\" 2x10 8\" 2x10   Modified lunges to bolster  3x10 LLE 3x10 LLE 3x10 LLE    2x10 LLE  2x10 LLE 2x10 LLE 3x10 LLE   TRX squats  NV            Stairs in clinic       Reciprocal ascending and descending   2x ea        Gait Training             Hurdles       Reciprocal stepping 4 hurdles   6x                     Modalities                                                "

## 2025-03-25 ENCOUNTER — OFFICE VISIT (OUTPATIENT)
Dept: PHYSICAL THERAPY | Facility: CLINIC | Age: 70
End: 2025-03-25
Payer: MEDICARE

## 2025-03-25 DIAGNOSIS — Z96.652 TOTAL KNEE REPLACEMENT STATUS, LEFT: Primary | ICD-10-CM

## 2025-03-25 DIAGNOSIS — G89.29 CHRONIC PAIN OF LEFT KNEE: ICD-10-CM

## 2025-03-25 DIAGNOSIS — M25.562 CHRONIC PAIN OF LEFT KNEE: ICD-10-CM

## 2025-03-25 PROCEDURE — 97112 NEUROMUSCULAR REEDUCATION: CPT | Performed by: PHYSICAL THERAPIST

## 2025-03-25 PROCEDURE — 97530 THERAPEUTIC ACTIVITIES: CPT | Performed by: PHYSICAL THERAPIST

## 2025-03-25 PROCEDURE — 97110 THERAPEUTIC EXERCISES: CPT | Performed by: PHYSICAL THERAPIST

## 2025-03-25 NOTE — PROGRESS NOTES
Daily Note     Today's date: 3/25/2025  Patient name: Sailaja Stubbs  : 1955  MRN: [id number]  Referring provider: Van Turner MD  Dx:   Encounter Diagnosis     ICD-10-CM    1. Total knee replacement status, left  Z96.652       2. Chronic pain of left knee  M25.562     G89.29           Start Time: 1030  Stop Time: 1115  Total time in clinic (min): 45 minutes    Subjective: Patient reports knee continues to feel good. Patient notes that she does have some discomfort when descending a incline.       Objective: See treatment diary below       Assessment: Tolerated treatment well. Continued with established POC. Introduced new isolated quad strengthening to address eccentric weakness when descending an incline. No adverse effects throughout treatment. Will continue to progress as able. Patient would benefit from continued PT      Plan: Continue per plan of care.      Precautions: hearing loss (increased volume)      Manuals 3/13 3/18 3/21 3/25  2/21 2/25 2/28 3/7 3/10   Manual knee stretching (FLEX AND EXT) ACP ACP    ACP  ACP  ACP     Patellar mobs  ACP ACP       Superior patellar mobs     ACP Superior patellar mobs     ACP   tibiofemoral joint mobs                          Neuro Re-Ed             Quad set with strap              Standing cross legged extension stretch  :10x5 (right leg crossed over left pushing left knee into extension)        :10x5 (right leg crossed over left pushing left knee into extension)  :10x5 (right leg crossed over left pushing left knee into extension)  :10x5 (right leg crossed over left pushing left knee into extension)    SLR  2x10  2x10 2x10  2x10      1x10  1x5  1x10  1x5                 Ther Ex             SAQ 2#   :05x2' 2#   :05x2' 2#   :05x2' 3# :05x2'     :05x2' :05x2'   LAQ      3x10        Knee flexion/extension machine  Leg ext/flex machine     11# BLE   3x10  ea  Leg ext/flex machine     Eccentric focus     11# 2x10 ea  Leg ext/flex machine     Eccentric focus     11# 2x10  "ea  Leg ext/flex machine     Eccentric focus     11# 2x10 ea    Leg ext/flex machine     11# BLE   10x ea  Leg ext/flex machine     11# BLE   2x10  ea  Leg ext/flex machine     11# BLE   2x10  ea  Leg ext/flex machine     11# BLE   3x10  ea    3-way hip       2x10 BLE 2x10 BLE  2#      Heel slides supine or seated       Pball roll ins     :10x2'   Pball roll ins     :10x2' Pball roll ins     :10x2'   Gastro stretch SB       :30x2   :30x2      Retrowalking at // bar              Heel raises       3x10       Standing hamstring curls       2x10        Sitting hamstring stretch on stool       :20x3        Leg press  BLE 40# 3x10  SL 20# 3x10  SL 20# 3x10 SL 20# 3x10    BLE 2x10 30#  BLE 2x10 30#  BLE 3x10 30#    NuStep-->BIke Recumbent bike 6 mins L2 Recumbent bike 6 mins L2 Recumbent bike 6 mins L2 Recumbent bike 6 mins L2  Lvl 2 6 mins Recumbent bike 6 mins L1 Recumbent bike 6 mins L1 Recumbent bike 6 mins L1 Recumbent bike 6 mins L2   Ther Activity             Sit to stands  7.5# 2x10  7.5# 2x10  7.5# 2x10  7.5# 2x10          Step downs  8\" 2x10 8\" 3x10 8\" 3x10   6\" 2x10  6\" 2x10  8\" 2x10 8\" 2x10 8\" 2x10   Heel taps     4\" LLE 2x10          Step ups  8\" 3x10 8\" 3x10 8\" 3x10 8\" 3x10  6\" 2x10  8\" 2x10 8\" 2x10 8\" 2x10 8\" 2x10   Modified lunges to bolster  3x10 LLE 3x10 LLE 3x10 LLE 3x10 LLE   2x10 LLE  2x10 LLE 2x10 LLE 3x10 LLE   TRX squats  NV            Heel elevated on foam squats (quad focus)     3x10 YKB         Stairs in clinic       Reciprocal ascending and descending   2x ea        Gait Training             Hurdles       Reciprocal stepping 4 hurdles   6x                     Modalities                                                "

## 2025-03-28 ENCOUNTER — OFFICE VISIT (OUTPATIENT)
Dept: PHYSICAL THERAPY | Facility: CLINIC | Age: 70
End: 2025-03-28
Payer: MEDICARE

## 2025-03-28 DIAGNOSIS — M25.562 CHRONIC PAIN OF LEFT KNEE: ICD-10-CM

## 2025-03-28 DIAGNOSIS — Z96.652 TOTAL KNEE REPLACEMENT STATUS, LEFT: Primary | ICD-10-CM

## 2025-03-28 DIAGNOSIS — G89.29 CHRONIC PAIN OF LEFT KNEE: ICD-10-CM

## 2025-03-28 PROCEDURE — 97110 THERAPEUTIC EXERCISES: CPT | Performed by: PHYSICAL THERAPIST

## 2025-03-28 PROCEDURE — 97530 THERAPEUTIC ACTIVITIES: CPT | Performed by: PHYSICAL THERAPIST

## 2025-03-28 PROCEDURE — 97112 NEUROMUSCULAR REEDUCATION: CPT | Performed by: PHYSICAL THERAPIST

## 2025-03-28 NOTE — PROGRESS NOTES
Daily Note     Today's date: 3/28/2025  Patient name: Sailaja Stubbs  : 1955  MRN: [id number]  Referring provider: Van Turner MD  Dx:   Encounter Diagnosis     ICD-10-CM    1. Total knee replacement status, left  Z96.652       2. Chronic pain of left knee  M25.562     G89.29             Start Time: 0800  Stop Time: 0845  Total time in clinic (min): 45 minutes    Subjective: Patient reports knee continues to feel good with no new changes since last session.      Objective: See treatment diary below       Assessment: Tolerated treatment well. Continued with established POC.  No adverse effects throughout treatment. Patient will return to PT in 3 weeks once she returns from cross country trip.  Will continue to progress as able. Patient would benefit from continued PT      Plan: Continue per plan of care.      Precautions: hearing loss (increased volume)      Manuals 3/13 3/18 3/21 3/25 3/28 2/21 2/25 2/28 3/7 3/10   Manual knee stretching (FLEX AND EXT) ACP ACP    ACP  ACP  ACP     Patellar mobs  ACP ACP       Superior patellar mobs     ACP Superior patellar mobs     ACP   tibiofemoral joint mobs                          Neuro Re-Ed             Quad set with strap              Standing cross legged extension stretch  :10x5 (right leg crossed over left pushing left knee into extension)        :10x5 (right leg crossed over left pushing left knee into extension)  :10x5 (right leg crossed over left pushing left knee into extension)  :10x5 (right leg crossed over left pushing left knee into extension)    SLR  2x10  2x10 2x10  2x10  2x10 2#    1x10  1x5  1x10  1x5                 Ther Ex             SAQ 2#   :05x2' 2#   :05x2' 2#   :05x2' 3# :05x2' 3# :05x2'    :05x2' :05x2'   LAQ      3x10        Knee flexion/extension machine  Leg ext/flex machine     11# BLE   3x10  ea  Leg ext/flex machine     Eccentric focus     11# 2x10 ea  Leg ext/flex machine     Eccentric focus     11# 2x10 ea  Leg ext/flex machine  "    Eccentric focus     11# 2x10 ea  Leg ext/flex machine     Eccentric focus     11# 2x10 ea   Leg ext/flex machine     11# BLE   10x ea  Leg ext/flex machine     11# BLE   2x10  ea  Leg ext/flex machine     11# BLE   2x10  ea  Leg ext/flex machine     11# BLE   3x10  ea    3-way hip       2x10 BLE 2x10 BLE  2#      Heel slides supine or seated       Pball roll ins     :10x2'   Pball roll ins     :10x2' Pball roll ins     :10x2'   Gastro stretch SB       :30x2   :30x2      Retrowalking at // bar              Heel raises       3x10       Standing hamstring curls       2x10        Sitting hamstring stretch on stool       :20x3        Leg press  BLE 40# 3x10  SL 20# 3x10  SL 20# 3x10 SL 20# 3x10 SL 20# 3x10   BLE 2x10 30#  BLE 2x10 30#  BLE 3x10 30#    NuStep-->BIke Recumbent bike 6 mins L2 Recumbent bike 6 mins L2 Recumbent bike 6 mins L2 Recumbent bike 6 mins L2 Recumbent bike 6 mins L2 Lvl 2 6 mins Recumbent bike 6 mins L1 Recumbent bike 6 mins L1 Recumbent bike 6 mins L1 Recumbent bike 6 mins L2   Ther Activity             Sit to stands  7.5# 2x10  7.5# 2x10  7.5# 2x10  7.5# 2x10  7.5# 2x10        Step downs  8\" 2x10 8\" 3x10 8\" 3x10   6\" 2x10  6\" 2x10  8\" 2x10 8\" 2x10 8\" 2x10   Heel taps     4\" LLE 2x10  4\" LLE 2x10         Step ups  8\" 3x10 8\" 3x10 8\" 3x10 8\" 3x10 8\" 3x10 6\" 2x10  8\" 2x10 8\" 2x10 8\" 2x10 8\" 2x10   Modified lunges to bolster  3x10 LLE 3x10 LLE 3x10 LLE 3x10 LLE 3x10 LLE  2x10 LLE  2x10 LLE 2x10 LLE 3x10 LLE   TRX squats  NV            Heel elevated on foam squats (quad focus)     3x10 YKB 3x10 YKB        Stairs in clinic       Reciprocal ascending and descending   2x ea        Gait Training             Hurdles       Reciprocal stepping 4 hurdles   6x                     Modalities                                                "

## 2025-04-15 ENCOUNTER — OFFICE VISIT (OUTPATIENT)
Dept: PHYSICAL THERAPY | Facility: CLINIC | Age: 70
End: 2025-04-15
Payer: MEDICARE

## 2025-04-15 DIAGNOSIS — G89.29 CHRONIC PAIN OF LEFT KNEE: ICD-10-CM

## 2025-04-15 DIAGNOSIS — M25.562 CHRONIC PAIN OF LEFT KNEE: ICD-10-CM

## 2025-04-15 DIAGNOSIS — Z96.652 TOTAL KNEE REPLACEMENT STATUS, LEFT: Primary | ICD-10-CM

## 2025-04-15 PROCEDURE — 97112 NEUROMUSCULAR REEDUCATION: CPT | Performed by: PHYSICAL THERAPIST

## 2025-04-15 PROCEDURE — 97110 THERAPEUTIC EXERCISES: CPT | Performed by: PHYSICAL THERAPIST

## 2025-04-15 NOTE — PROGRESS NOTES
Daily Note     Today's date: 4/15/2025  Patient name: Sailaja Stubbs  : 1955  MRN: [id number]  Referring provider: Van Turner MD  Dx:   Encounter Diagnosis     ICD-10-CM    1. Total knee replacement status, left  Z96.652       2. Chronic pain of left knee  M25.562     G89.29               Start Time: 1145  Stop Time: 1230  Total time in clinic (min): 45 minutes    Subjective: Patient reports she did a lot of walking and sitting while on her trip. Patient notes that the knee held up pretty well but would get some soreness here and there. Patient notes that it still feels tight at times and has to force it with certain ADLs. Patient notes decreased stability with descending stairs.       Objective: See treatment diary below       Assessment: Tolerated treatment well. Patient returns to PT after 3 weeks since being on vacation. No regression in ROM with flexion at 125 degrees and extension at 2 degrees. Resumed POC focused on LE strengthening and AROM.  Will continue to progress as able. Patient would benefit from continued PT      Plan: Continue per plan of care.      Precautions: hearing loss (increased volume)      Manuals 3/13 3/18 3/21 3/25 3/28 4/15 2/25 2/28 3/7 3/10   Manual knee stretching (FLEX AND EXT) ACP ACP    ACP  ACP  ACP     Patellar mobs  ACP ACP       Superior patellar mobs     ACP Superior patellar mobs     ACP   tibiofemoral joint mobs                          Neuro Re-Ed             Quad set with strap              Standing cross legged extension stretch  :10x5 (right leg crossed over left pushing left knee into extension)        :10x5 (right leg crossed over left pushing left knee into extension)  :10x5 (right leg crossed over left pushing left knee into extension)  :10x5 (right leg crossed over left pushing left knee into extension)    SLR  2x10  2x10 2x10  2x10  2x10 2#    1x10  1x5  1x10  1x5                 Ther Ex             SAQ 2#   :05x2' 2#   :05x2' 2#   :05x2' 3# :05x2' 3# :05x2'   "  :05x2' :05x2'   LAQ             Knee flexion/extension machine  Leg ext/flex machine     11# BLE   3x10  ea  Leg ext/flex machine     Eccentric focus     11# 2x10 ea  Leg ext/flex machine     Eccentric focus     11# 2x10 ea  Leg ext/flex machine     Eccentric focus     11# 2x10 ea  Leg ext/flex machine     Eccentric focus     11# 2x10 ea   Leg ext/flex machine     11# BLE   10x ea  Leg ext/flex machine     11# BLE   2x10  ea  Leg ext/flex machine     11# BLE   2x10  ea  Leg ext/flex machine     11# BLE   3x10  ea    3-way hip        2x10 BLE  2#      Heel slides supine or seated          Pball roll ins     :10x2' Pball roll ins     :10x2'   Gastro stretch SB         :30x2      Supine TKE on pball       :05x2'       Pball roll ins        :10x2'       Leg press  BLE 40# 3x10  SL 20# 3x10  SL 20# 3x10 SL 20# 3x10 SL 20# 3x10   BLE 2x10 30#  BLE 2x10 30#  BLE 3x10 30#    NuStep-->BIke Recumbent bike 6 mins L2 Recumbent bike 6 mins L2 Recumbent bike 6 mins L2 Recumbent bike 6 mins L2 Recumbent bike 6 mins L2 Lvl 2 6 mins Recumbent bike 6 mins L1 Recumbent bike 6 mins L1 Recumbent bike 6 mins L1 Recumbent bike 6 mins L2   Ther Activity             Sit to stands  7.5# 2x10  7.5# 2x10  7.5# 2x10  7.5# 2x10  7.5# 2x10        Step downs  8\" 2x10 8\" 3x10 8\" 3x10    6\" 2x10  8\" 2x10 8\" 2x10 8\" 2x10   Heel taps     4\" LLE 2x10  4\" LLE 2x10         Step ups  8\" 3x10 8\" 3x10 8\" 3x10 8\" 3x10 8\" 3x10 8\" 2x15 8\" 2x10 8\" 2x10 8\" 2x10 8\" 2x10   Modified lunges to bolster  3x10 LLE 3x10 LLE 3x10 LLE 3x10 LLE 3x10 LLE 3x10 LLE 2x10 LLE  2x10 LLE 2x10 LLE 3x10 LLE   TRX squats  NV            Heel elevated on foam squats (quad focus)     3x10 YKB 3x10 YKB 3x10 YKB       Stairs in clinic              Gait Training             Hurdles                           Modalities                                                "

## 2025-04-18 ENCOUNTER — OFFICE VISIT (OUTPATIENT)
Dept: PHYSICAL THERAPY | Facility: CLINIC | Age: 70
End: 2025-04-18
Payer: MEDICARE

## 2025-04-18 DIAGNOSIS — Z96.652 TOTAL KNEE REPLACEMENT STATUS, LEFT: Primary | ICD-10-CM

## 2025-04-18 DIAGNOSIS — M25.562 CHRONIC PAIN OF LEFT KNEE: ICD-10-CM

## 2025-04-18 DIAGNOSIS — G89.29 CHRONIC PAIN OF LEFT KNEE: ICD-10-CM

## 2025-04-18 PROCEDURE — 97110 THERAPEUTIC EXERCISES: CPT | Performed by: PHYSICAL THERAPIST

## 2025-04-18 PROCEDURE — 97112 NEUROMUSCULAR REEDUCATION: CPT | Performed by: PHYSICAL THERAPIST

## 2025-04-18 PROCEDURE — 97530 THERAPEUTIC ACTIVITIES: CPT | Performed by: PHYSICAL THERAPIST

## 2025-04-18 NOTE — PROGRESS NOTES
Daily Note     Today's date: 2025  Patient name: Sailaja Stubbs  : 1955  MRN: [id number]  Referring provider: Van Turner MD  Dx:   Encounter Diagnosis     ICD-10-CM    1. Total knee replacement status, left  Z96.652       2. Chronic pain of left knee  M25.562     G89.29                 Start Time: 1100  Stop Time: 1145  Total time in clinic (min): 45 minutes    Subjective: Patient reports knee is feeling fine. Patient notes that its hard to get on and off the floor while doing alterations still.       Objective: See treatment diary below       Assessment: Tolerated treatment well. Continued focus on quad strength with functional exercises. Will continue to progress as able. Patient would benefit from continued PT      Plan: Continue per plan of care.      Precautions: hearing loss (increased volume)      Manuals 3/13 3/18 3/21 3/25 3/28 4/15 4/18 2/28 3/7 3/10   Manual knee stretching (FLEX AND EXT) ACP ACP    ACP   ACP     Patellar mobs  ACP ACP       Superior patellar mobs     ACP Superior patellar mobs     ACP   tibiofemoral joint mobs                          Neuro Re-Ed             Quad set with strap              Standing cross legged extension stretch  :10x5 (right leg crossed over left pushing left knee into extension)        :10x5 (right leg crossed over left pushing left knee into extension)  :10x5 (right leg crossed over left pushing left knee into extension)  :10x5 (right leg crossed over left pushing left knee into extension)    SLR  2x10  2x10 2x10  2x10  2x10 2#    1x10  1x5  1x10  1x5                 Ther Ex             SAQ 2#   :05x2' 2#   :05x2' 2#   :05x2' 3# :05x2' 3# :05x2'    :05x2' :05x2'   LAQ             Knee flexion/extension machine  Leg ext/flex machine     11# BLE   3x10  ea  Leg ext/flex machine     Eccentric focus     11# 2x10 ea  Leg ext/flex machine     Eccentric focus     11# 2x10 ea  Leg ext/flex machine     Eccentric focus     11# 2x10 ea  Leg ext/flex machine  "    Eccentric focus     11# 2x10 ea   Leg ext/flex machine     11# BLE   10x ea  Leg ext/flex machine     11# BLE   2x10  ea  Leg ext/flex machine     11# BLE   2x10  ea  Leg ext/flex machine     11# BLE   3x10  ea    3-way hip              Heel slides supine or seated          Pball roll ins     :10x2' Pball roll ins     :10x2'   Gastro stretch SB         :30x2      Supine TKE on pball       :05x2' :05x2' with baxter ball       Pball roll ins        :10x2'       Leg press  BLE 40# 3x10  SL 20# 3x10  SL 20# 3x10 SL 20# 3x10 SL 20# 3x10   BLE 2x10 30#  BLE 2x10 30#  BLE 3x10 30#    NuStep-->BIke Recumbent bike 6 mins L2 Recumbent bike 6 mins L2 Recumbent bike 6 mins L2 Recumbent bike 6 mins L2 Recumbent bike 6 mins L2 Lvl 2 6 mins  Recumbent bike 6 mins L1 Recumbent bike 6 mins L1 Recumbent bike 6 mins L2   Ther Activity             Squat taps to hi-lo table        Lowest setting  RKB   2x10       Sit to stands  7.5# 2x10  7.5# 2x10  7.5# 2x10  7.5# 2x10  7.5# 2x10        Step downs  8\" 2x10 8\" 3x10 8\" 3x10     8\" 2x10 8\" 2x10 8\" 2x10   Heel taps     4\" LLE 2x10  4\" LLE 2x10   4\" LLE 2x10       Step ups  8\" 3x10 8\" 3x10 8\" 3x10 8\" 3x10 8\" 3x10 8\" 2x15  8\" 2x10 8\" 2x10 8\" 2x10   Modified lunges to bolster  3x10 LLE 3x10 LLE 3x10 LLE 3x10 LLE 3x10 LLE 3x10 LLE 2x10 BLE   2x10 LLE 2x10 LLE 3x10 LLE   TRX squats  NV            Heel elevated on foam squats (quad focus)     3x10 YKB 3x10 YKB 3x10 YKB 3x10 YKB      Stairs in clinic              Gait Training             Hurdles                           Modalities                                                "

## 2025-04-21 ENCOUNTER — OFFICE VISIT (OUTPATIENT)
Dept: PHYSICAL THERAPY | Facility: CLINIC | Age: 70
End: 2025-04-21
Payer: MEDICARE

## 2025-04-21 DIAGNOSIS — M25.562 CHRONIC PAIN OF LEFT KNEE: ICD-10-CM

## 2025-04-21 DIAGNOSIS — Z96.652 TOTAL KNEE REPLACEMENT STATUS, LEFT: Primary | ICD-10-CM

## 2025-04-21 DIAGNOSIS — G89.29 CHRONIC PAIN OF LEFT KNEE: ICD-10-CM

## 2025-04-21 PROCEDURE — 97530 THERAPEUTIC ACTIVITIES: CPT

## 2025-04-21 PROCEDURE — 97110 THERAPEUTIC EXERCISES: CPT

## 2025-04-21 PROCEDURE — 97112 NEUROMUSCULAR REEDUCATION: CPT

## 2025-04-21 NOTE — PROGRESS NOTES
Daily Note     Today's date: 2025  Patient name: Sailaja Stubbs  : 1955  MRN: [id number]  Referring provider: Van Turner MD  Dx:   Encounter Diagnosis     ICD-10-CM    1. Total knee replacement status, left  Z96.652       2. Chronic pain of left knee  M25.562     G89.29                      Subjective: Patient L knee is doing well, notes increased clicking this morning.      Objective: See treatment diary below      Assessment: Tolerated treatment well. Patient demonstrated fatigue post treatment and would benefit from continued PT.  Continued with established POC as indicated.  Intermittent cueing needed for exercise form maintenance with good carry over.  Difficulty maintaining TKE during supine SLR due to L quad weakness.  Patient follows up with ortho next week.        Plan: Progress treatment as tolerated.  .  RE NV.       Precautions: hearing loss (increased volume)      Manuals 3/13 3/18 3/21 3/25 3/28 4/15 4/18 4/21  3/10   Manual knee stretching (FLEX AND EXT) ACP ACP    ACP   MC     Patellar mobs  ACP ACP        Superior patellar mobs     ACP   tibiofemoral joint mobs                          Neuro Re-Ed             Quad set with strap              Standing cross legged extension stretch  :10x5 (right leg crossed over left pushing left knee into extension)          :10x5 (right leg crossed over left pushing left knee into extension)    SLR  2x10  2x10 2x10  2x10  2x10 2#   2# 2x10  1x10  1x5                 Ther Ex             SAQ 2#   :05x2' 2#   :05x2' 2#   :05x2' 3# :05x2' 3# :05x2'     :05x2'   LAQ             Knee flexion/extension machine  Leg ext/flex machine     11# BLE   3x10  ea  Leg ext/flex machine     Eccentric focus     11# 2x10 ea  Leg ext/flex machine     Eccentric focus     11# 2x10 ea  Leg ext/flex machine     Eccentric focus     11# 2x10 ea  Leg ext/flex machine     Eccentric focus     11# 2x10 ea   Leg ext/flex machine     11# BLE   10x ea  Leg ext/flex machine 11# BLE 2x10  "ea  Leg ext/flex machine     11# BLE   3x10  ea    3-way hip              Heel slides supine or seated           Pball roll ins     :10x2'   Gastro stretch SB              Supine TKE on pball       :05x2' :05x2' with baxter ball  :05x2'     Pball roll ins        :10x2'       Leg press  BLE 40# 3x10  SL 20# 3x10  SL 20# 3x10 SL 20# 3x10 SL 20# 3x10   SL 25# 2x10  BLE 3x10 30#    NuStep-->BIke Recumbent bike 6 mins L2 Recumbent bike 6 mins L2 Recumbent bike 6 mins L2 Recumbent bike 6 mins L2 Recumbent bike 6 mins L2 Lvl 2 6 mins  L2 6'  Recumbent bike 6 mins L2   Ther Activity             Squat taps to hi-lo table        Lowest setting  RKB   2x10  Lowest setting RKB 2x10     Sit to stands  7.5# 2x10  7.5# 2x10  7.5# 2x10  7.5# 2x10  7.5# 2x10        Step downs  8\" 2x10 8\" 3x10 8\" 3x10       8\" 2x10   Heel taps     4\" LLE 2x10  4\" LLE 2x10   4\" LLE 2x10  4\" LLE 2x10     Step ups  8\" 3x10 8\" 3x10 8\" 3x10 8\" 3x10 8\" 3x10 8\" 2x15  8\" 2x15  8\" 2x10   Modified lunges to bolster  3x10 LLE 3x10 LLE 3x10 LLE 3x10 LLE 3x10 LLE 3x10 LLE 2x10 BLE   2x10 BLE  3x10 LLE   TRX squats  NV            Heel elevated on foam squats (quad focus)     3x10 YKB 3x10 YKB 3x10 YKB 3x10 YKB 3x10 YKB     Stairs in clinic              Gait Training             Hurdles                           Modalities                                                  "

## 2025-05-02 ENCOUNTER — EVALUATION (OUTPATIENT)
Dept: PHYSICAL THERAPY | Facility: CLINIC | Age: 70
End: 2025-05-02
Payer: MEDICARE

## 2025-05-02 DIAGNOSIS — M25.562 CHRONIC PAIN OF LEFT KNEE: ICD-10-CM

## 2025-05-02 DIAGNOSIS — Z96.652 TOTAL KNEE REPLACEMENT STATUS, LEFT: Primary | ICD-10-CM

## 2025-05-02 DIAGNOSIS — G89.29 CHRONIC PAIN OF LEFT KNEE: ICD-10-CM

## 2025-05-02 PROCEDURE — 97112 NEUROMUSCULAR REEDUCATION: CPT | Performed by: PHYSICAL THERAPIST

## 2025-05-02 PROCEDURE — 97110 THERAPEUTIC EXERCISES: CPT | Performed by: PHYSICAL THERAPIST

## 2025-05-02 PROCEDURE — 97530 THERAPEUTIC ACTIVITIES: CPT | Performed by: PHYSICAL THERAPIST

## 2025-05-02 NOTE — LETTER
May 2, 2025    Van Turner MD  [address] [facility] [address]    Patient: Sailaja Stubbs   YOB: 1955   Date of Visit: 2025     Encounter Diagnosis     ICD-10-CM    1. Total knee replacement status, left  Z96.652       2. Chronic pain of left knee  M25.562     G89.29           Dear Dr. Van Turner MD:    Thank you for your recent referral of Sailaja Stubbs. Please review the attached evaluation summary from Sailaja's recent visit.     Please verify that you agree with the plan of care by signing the attached order.     If you have any questions or concerns, please do not hesitate to call.     I sincerely appreciate the opportunity to share in the care of one of your patients and hope to have another opportunity to work with you in the near future.       Sincerely,    Dian Pagan, PT      Referring Provider:      I certify that I have read the below Plan of Care and certify the need for these services furnished under this plan of treatment while under my care.                    Van Turner MD  [address] [facility] [address]  Via Fax: [phone]          PT RE-EVALUATION    Today's date: 25  Patient name: Sailaja Stubbs  : 1955  MRN: [id number]  Referring provider: Van Turner MD  Dx:   1. Total knee replacement status, left    2. Chronic pain of left knee          ASSESSMENT:   Sailaja Stubbs is a 69 y.o. female who is s/p Left TKA due to chronic pain.  Upon evaluation, patient demonstrates good improvements with knee ROM, strength, and overall pain. Patient is ambulating without an AD independently with minimal gait deficits. Patient AROM is near normal with mild limitation in extension. Some residual quad weakness with isolated strengthening as evident by minor extension lag with weight SLR. Patient has met all of her short term goals and is progressing appropriately toward long term goals. Patient would benefit from continued skilled physical therapy  to address the impairments, improve their level of function, and to improve their overall quality of life. Will utilize 2 remaining PT session to finalize HEP and ensure proper technique with all exercises and plan for tentative discharge then.         Impairments:    restricted ROM    decreased strength   pain with function   activity intolerance   weight bearing intolerance   abnormal gait     Prognosis:  Good  Positive and negative prognostic indicator(s):  pain >3 months and multiple comorbidities    Goals:     Short Term Goals: to be achieved by 4 weeks  1) Patient to be independent with basic HEP. MET  2) Decrease pain to 5/10 at its worst.  MET  3) Increase knee ROM by 5-10 degrees  MET  4) Increase LE strength by 1/2 MMT grade in all deficient planes.  MET    Long Term Goals: to be achieved by discharge  1) FOTO equal to or greater than target score indicating improvements with overall function. MET  2) Ambulation to improve to maximal level of function MET  3) Stair negotiation will improve to reciprocal. MET  4) Sit to stand transfers will improve to maximal level of function MET    NEW LONG TERM GOAL:  1) finalize and be independent in comprehensive HEP.       Planned interventions:  home exercise program, patient education, manual therapy, graded activity, flexibility, functional range of motion exercises, strengthening, abdominal trunk stabilization, balance and weight bearing training, gait training, and modalities prn    Duration in visits:  8  Frequency: 2 visits per week  Duration in weeks:  4    History of Current Injury: Patient reports she is doing well overall with little to no pain and describes more achiness. Patient notes that she is back to most of her normal activities with some restrictions such as kneeling.     Pain location: joint line global   Pain description: dull ache   Pain at worst: 1/10 (FROM 2/10)   Pain at best: 0/10       Aggravating factors: after using the bike   Easing  factors: game ready at home for icing, rest     Imaging: see imaging tab   Special Questions: Has BLE edema from varicose veins. Denies numbness of tingling in the legs and feet.     HOME SET-UP  Social support: lives with  that will be able to help.   Home Type: 2 story home  MAYKEL: through garage you walk in on ground level and go up full flight of stairs to main level. Wide stair case with one railing.   Steps Inside: full flight of stairs to master bedroom. There is a spare room on first floor with bathroom that she will be able to use.   Shower: full bathroom on first floor with walk in shower. Grab bars and shower bench.   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   Patient goals: Patient reports goals for physical therapy would be decreased pain, increased mobility, increased strength, and return to recreation  return to recreational walking program       Objective     Observations   Left Knee   Positive for incision.     Additional Observation Details  Incision intact and healed.    Neurological Testing     Sensation     Knee   Left Knee   Intact: Light touch    Right Knee   Intact: light touch     Active Range of Motion   Left Knee   Flexion: 120 degrees   Extension: 1 degrees     Additional Active Range of Motion Details  Pain with over pressure     Mobility   Patellar Mobility:   Left Knee   WFL: medial, lateral, superior and inferior.     Strength/Myotome Testing     Left Knee   Flexion: 4  Extension: 4+  Quadriceps contraction: good    Additional Strength Details  Proper superior patellar translation with isolated contraction.     Ambulation   Weight-Bearing Status   Weight-Bearing Status (Left): weight-bearing as tolerated   Assistive device used:  none    Ambulation: Level Surfaces   Ambulation without assistive device: independent    Ambulation: Stairs   Ascend stairs: independent  Pattern: reciprocal  Railings: without rails  Descend stairs: independent  Pattern: non-reciprocal  Railings: one rail    Observational Gait   Gait: antalgic   Stride length, right stance time, right swing time and right step length within functional limits. Decreased walking speed, left stance time, left swing time and left step length.   Left foot contact pattern: heel to toe  Right foot contact pattern: heel to toe  Left arm swing: within functional limits  Right arm swing: within functional limits  Base of support: normal    Comments   TUG: 10 seconds (FROM 23 seconds with RW. UE for support)     Functional Assessment        Comments  Functional sit <>stand: able to perform without UE support   5xSTS:12 seconds without UE  (FROM 13 seconds with UE support)           Precautions: hearing loss (increased       Manuals 3/13 3/18 3/21 3/25 3/28 4/15 4/18 4/21  3/10   Manual knee stretching (FLEX AND EXT) ACP ACP    ACP   MC     Patellar mobs  ACP ACP        Superior patellar mobs     ACP   tibiofemoral joint mobs                          Neuro Re-Ed             Quad set with strap              Standing cross legged extension stretch  :10x5 (right leg crossed over left pushing left knee into extension)          :10x5 (right leg crossed over left pushing left knee into extension)    SLR  2x10  2x10 2x10  2x10  2x10 2#   2# 2x10  1x10  1x5                 Ther Ex             SAQ 2#   :05x2' 2#   :05x2' 2#   :05x2' 3# :05x2' 3# :05x2'     :05x2'   LAQ             Knee flexion/extension machine  Leg ext/flex machine     11# BLE   3x10  ea  Leg ext/flex machine     Eccentric focus     11# 2x10 ea  Leg ext/flex machine     Eccentric focus     11# 2x10 ea  Leg ext/flex machine     Eccentric focus     11# 2x10 ea  Leg ext/flex machine     Eccentric focus     11# 2x10 ea   Leg ext/flex  "machine     11# BLE   10x ea  Leg ext/flex machine 11# BLE 2x10 ea  Leg ext/flex machine     11# BLE   3x10  ea    3-way hip              Heel slides supine or seated           Pball roll ins     :10x2'   Gastro stretch SB              Supine TKE on pball       :05x2' :05x2' with baxter ball  :05x2'     Pball roll ins        :10x2'       Leg press  BLE 40# 3x10  SL 20# 3x10  SL 20# 3x10 SL 20# 3x10 SL 20# 3x10   SL 25# 2x10 SL 25# 2x10 BLE 3x10 30#    NuStep-->BIke Recumbent bike 6 mins L2 Recumbent bike 6 mins L2 Recumbent bike 6 mins L2 Recumbent bike 6 mins L2 Recumbent bike 6 mins L2 Lvl 2 6 mins  L2 6' L2 6' Recumbent bike 6 mins L2   Ther Activity             Squat taps to hi-lo table        Lowest setting  RKB   2x10  Lowest setting RKB 2x10 Lowest setting RKB 2x10    Sit to stands  7.5# 2x10  7.5# 2x10  7.5# 2x10  7.5# 2x10  7.5# 2x10        Step downs  8\" 2x10 8\" 3x10 8\" 3x10       8\" 2x10   Heel taps     4\" LLE 2x10  4\" LLE 2x10   4\" LLE 2x10  4\" LLE 2x10 4\" LLE 2x10    FWD heel taps     Step ups  8\" 3x10 8\" 3x10 8\" 3x10 8\" 3x10 8\" 3x10 8\" 2x15  8\" 2x15 8\" 2x15 8\" 2x10   Modified lunges to bolster  3x10 LLE 3x10 LLE 3x10 LLE 3x10 LLE 3x10 LLE 3x10 LLE 2x10 BLE   2x10 BLE 2x10 BLE 3x10 LLE   TRX squats  NV            Heel elevated on foam squats (quad focus)     3x10 YKB 3x10 YKB 3x10 YKB 3x10 YKB 3x10 YKB 3x10 RKB     Stairs in clinic              Gait Training             Hurdles                           Modalities                                                                "

## 2025-05-02 NOTE — PROGRESS NOTES
PT RE-EVALUATION    Today's date: 25  Patient name: Sailaja Stubbs  : 1955  MRN: [id number]  Referring provider: Van Turner MD  Dx:   1. Total knee replacement status, left    2. Chronic pain of left knee          ASSESSMENT:   Sailaja Stubbs is a 69 y.o. female who is s/p Left TKA due to chronic pain.  Upon evaluation, patient demonstrates good improvements with knee ROM, strength, and overall pain. Patient is ambulating without an AD independently with minimal gait deficits. Patient AROM is near normal with mild limitation in extension. Some residual quad weakness with isolated strengthening as evident by minor extension lag with weight SLR. Patient has met all of her short term goals and is progressing appropriately toward long term goals. Patient would benefit from continued skilled physical therapy to address the impairments, improve their level of function, and to improve their overall quality of life. Will utilize 2 remaining PT session to finalize HEP and ensure proper technique with all exercises and plan for tentative discharge then.         Impairments:    restricted ROM    decreased strength   pain with function   activity intolerance   weight bearing intolerance   abnormal gait     Prognosis:  Good  Positive and negative prognostic indicator(s):  pain >3 months and multiple comorbidities    Goals:     Short Term Goals: to be achieved by 4 weeks  1) Patient to be independent with basic HEP. MET  2) Decrease pain to 5/10 at its worst.  MET  3) Increase knee ROM by 5-10 degrees  MET  4) Increase LE strength by 1/2 MMT grade in all deficient planes.  MET    Long Term Goals: to be achieved by discharge  1) FOTO equal to or greater than target score indicating improvements with overall function. MET  2) Ambulation to improve to maximal level of function MET  3) Stair negotiation will improve to reciprocal. MET  4) Sit to stand transfers will improve to maximal level of function MET    NEW LONG TERM  GOAL:  1) finalize and be independent in comprehensive HEP.       Planned interventions:  home exercise program, patient education, manual therapy, graded activity, flexibility, functional range of motion exercises, strengthening, abdominal trunk stabilization, balance and weight bearing training, gait training, and modalities prn    Duration in visits:  8  Frequency: 2 visits per week  Duration in weeks:  4    History of Current Injury: Patient reports she is doing well overall with little to no pain and describes more achiness. Patient notes that she is back to most of her normal activities with some restrictions such as kneeling.     Pain location: joint line global   Pain description: dull ache   Pain at worst: 1/10 (FROM 2/10)   Pain at best: 0/10       Aggravating factors: after using the bike   Easing factors: game ready at home for icing, rest     Imaging: see imaging tab   Special Questions: Has BLE edema from varicose veins. Denies numbness of tingling in the legs and feet.     HOME SET-UP  Social support: lives with  that will be able to help.   Home Type: 2 story home  MAYKEL: through garage you walk in on ground level and go up full flight of stairs to main level. Wide stair case with one railing.   Steps Inside: full flight of stairs to master bedroom. There is a spare room on first floor with bathroom that she will be able to use.   Shower: full bathroom on first floor with walk in shower. Grab bars and shower bench.   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   Patient goals: Patient reports goals for physical therapy would be decreased pain, increased mobility, increased strength, and return to recreation  return to recreational walking program       Objective      Observations   Left Knee   Positive for incision.     Additional Observation Details  Incision intact and healed.    Neurological Testing     Sensation     Knee   Left Knee   Intact: Light touch    Right Knee   Intact: light touch     Active Range of Motion   Left Knee   Flexion: 120 degrees   Extension: 1 degrees     Additional Active Range of Motion Details  Pain with over pressure     Mobility   Patellar Mobility:   Left Knee   WFL: medial, lateral, superior and inferior.     Strength/Myotome Testing     Left Knee   Flexion: 4  Extension: 4+  Quadriceps contraction: good    Additional Strength Details  Proper superior patellar translation with isolated contraction.     Ambulation   Weight-Bearing Status   Weight-Bearing Status (Left): weight-bearing as tolerated   Assistive device used: none    Ambulation: Level Surfaces   Ambulation without assistive device: independent    Ambulation: Stairs   Ascend stairs: independent  Pattern: reciprocal  Railings: without rails  Descend stairs: independent  Pattern: non-reciprocal  Railings: one rail    Observational Gait   Gait: antalgic   Stride length, right stance time, right swing time and right step length within functional limits. Decreased walking speed, left stance time, left swing time and left step length.   Left foot contact pattern: heel to toe  Right foot contact pattern: heel to toe  Left arm swing: within functional limits  Right arm swing: within functional limits  Base of support: normal    Comments   TUG: 10 seconds (FROM 23 seconds with RW. UE for support)     Functional Assessment        Comments  Functional sit <>stand: able to perform without UE support   5xSTS:12 seconds without UE  (FROM 13 seconds with UE support)           Precautions: hearing loss (increased       Manuals 3/13 3/18 3/21 3/25 3/28 4/15 4/18 4/21  3/10   Manual knee stretching (FLEX AND EXT) ACP ACP    ACP   MC     Patellar mobs  ACP ACP        Superior patellar mobs     ACP  "  tibiofemoral joint mobs                          Neuro Re-Ed             Quad set with strap              Standing cross legged extension stretch  :10x5 (right leg crossed over left pushing left knee into extension)          :10x5 (right leg crossed over left pushing left knee into extension)    SLR  2x10  2x10 2x10  2x10  2x10 2#   2# 2x10  1x10  1x5                 Ther Ex             SAQ 2#   :05x2' 2#   :05x2' 2#   :05x2' 3# :05x2' 3# :05x2'     :05x2'   LAQ             Knee flexion/extension machine  Leg ext/flex machine     11# BLE   3x10  ea  Leg ext/flex machine     Eccentric focus     11# 2x10 ea  Leg ext/flex machine     Eccentric focus     11# 2x10 ea  Leg ext/flex machine     Eccentric focus     11# 2x10 ea  Leg ext/flex machine     Eccentric focus     11# 2x10 ea   Leg ext/flex machine     11# BLE   10x ea  Leg ext/flex machine 11# BLE 2x10 ea  Leg ext/flex machine     11# BLE   3x10  ea    3-way hip              Heel slides supine or seated           Pball roll ins     :10x2'   Gastro stretch SB              Supine TKE on pball       :05x2' :05x2' with baxter ball  :05x2'     Pball roll ins        :10x2'       Leg press  BLE 40# 3x10  SL 20# 3x10  SL 20# 3x10 SL 20# 3x10 SL 20# 3x10   SL 25# 2x10 SL 25# 2x10 BLE 3x10 30#    NuStep-->BIke Recumbent bike 6 mins L2 Recumbent bike 6 mins L2 Recumbent bike 6 mins L2 Recumbent bike 6 mins L2 Recumbent bike 6 mins L2 Lvl 2 6 mins  L2 6' L2 6' Recumbent bike 6 mins L2   Ther Activity             Squat taps to hi-lo table        Lowest setting  RKB   2x10  Lowest setting RKB 2x10 Lowest setting RKB 2x10    Sit to stands  7.5# 2x10  7.5# 2x10  7.5# 2x10  7.5# 2x10  7.5# 2x10        Step downs  8\" 2x10 8\" 3x10 8\" 3x10       8\" 2x10   Heel taps     4\" LLE 2x10  4\" LLE 2x10   4\" LLE 2x10  4\" LLE 2x10 4\" LLE 2x10    FWD heel taps     Step ups  8\" 3x10 8\" 3x10 8\" 3x10 8\" 3x10 8\" 3x10 8\" 2x15  8\" 2x15 8\" 2x15 8\" 2x10   Modified lunges to bolster  3x10 LLE 3x10 LLE " 3x10 LLE 3x10 LLE 3x10 LLE 3x10 LLE 2x10 BLE   2x10 BLE 2x10 BLE 3x10 LLE   TRX squats  NV            Heel elevated on foam squats (quad focus)     3x10 YKB 3x10 YKB 3x10 YKB 3x10 YKB 3x10 YKB 3x10 RKB     Stairs in clinic              Gait Training             Hurdles                           Modalities

## 2025-05-09 ENCOUNTER — OFFICE VISIT (OUTPATIENT)
Dept: PHYSICAL THERAPY | Facility: CLINIC | Age: 70
End: 2025-05-09
Payer: MEDICARE

## 2025-05-09 DIAGNOSIS — M25.562 CHRONIC PAIN OF LEFT KNEE: ICD-10-CM

## 2025-05-09 DIAGNOSIS — G89.29 CHRONIC PAIN OF LEFT KNEE: ICD-10-CM

## 2025-05-09 DIAGNOSIS — Z96.652 TOTAL KNEE REPLACEMENT STATUS, LEFT: Primary | ICD-10-CM

## 2025-05-09 PROCEDURE — 97110 THERAPEUTIC EXERCISES: CPT | Performed by: PHYSICAL THERAPIST

## 2025-05-09 PROCEDURE — 97112 NEUROMUSCULAR REEDUCATION: CPT | Performed by: PHYSICAL THERAPIST

## 2025-05-09 PROCEDURE — 97530 THERAPEUTIC ACTIVITIES: CPT | Performed by: PHYSICAL THERAPIST

## 2025-05-09 NOTE — PROGRESS NOTES
Daily Note     Today's date: 2025  Patient name: Sailaja Stubbs  : 1955  MRN: [id number]  Referring provider: Van Turner MD  Dx:   Encounter Diagnosis     ICD-10-CM    1. Total knee replacement status, left  Z96.652       2. Chronic pain of left knee  M25.562     G89.29             Start Time: 1100  Stop Time: 1138  Total time in clinic (min): 38 minutes    Subjective: Patient notes no new changes since last session.       Objective: See treatment diary below      Assessment: Tolerated treatment well. Good response to progressions in reps and resistance this date. No adverse effects throughout session. Will continue to focus on functional strength to prepare for discharge. Patient demonstrated fatigue post treatment and would benefit from continued PT.          Plan: Progress treatment as tolerated.  .         Precautions: hearing loss (increased volume)      Manuals 3/13 3/18 3/21 3/25 3/28 4/15 4/18 4/21 5/2 5/9   Manual knee stretching (FLEX AND EXT) ACP ACP    ACP   MC     Patellar mobs  ACP ACP           tibiofemoral joint mobs                          Neuro Re-Ed             Quad set with strap              Standing cross legged extension stretch  :10x5 (right leg crossed over left pushing left knee into extension)             SLR  2x10  2x10 2x10  2x10  2x10 2#   2# 2x10  2# 2x10                Ther Ex             SAQ 2#   :05x2' 2#   :05x2' 2#   :05x2' 3# :05x2' 3# :05x2'        LAQ             Knee flexion/extension machine  Leg ext/flex machine     11# BLE   3x10  ea  Leg ext/flex machine     Eccentric focus     11# 2x10 ea  Leg ext/flex machine     Eccentric focus     11# 2x10 ea  Leg ext/flex machine     Eccentric focus     11# 2x10 ea  Leg ext/flex machine     Eccentric focus     11# 2x10 ea   Leg ext/flex machine     11# BLE   10x ea  Leg ext/flex machine 11# BLE 2x10 ea  Leg ext/flex machine eccentric focus     22# BLE   3x10  ea    3-way hip              Heel slides supine or seated          "     Gastro stretch SB              Supine TKE on pball       :05x2' :05x2' with baxter ball  :05x2'     Pball roll ins        :10x2'       Leg press  BLE 40# 3x10  SL 20# 3x10  SL 20# 3x10 SL 20# 3x10 SL 20# 3x10   SL 25# 2x10 SL 25# 2x10 SL 25# 2x10   NuStep-->BIke Recumbent bike 6 mins L2 Recumbent bike 6 mins L2 Recumbent bike 6 mins L2 Recumbent bike 6 mins L2 Recumbent bike 6 mins L2 Lvl 2 6 mins  L2 6' L2 6' Recumbent bike 6 mins L2   Ther Activity             Squat taps to hi-lo table        Lowest setting  RKB   2x10  Lowest setting RKB 2x10 Lowest setting RKB 2x10 Lowest setting RKB 2x10   Sit to stands  7.5# 2x10  7.5# 2x10  7.5# 2x10  7.5# 2x10  7.5# 2x10        Step downs  8\" 2x10 8\" 3x10 8\" 3x10          Heel taps     4\" LLE 2x10  4\" LLE 2x10   4\" LLE 2x10  4\" LLE 2x10 4\" LLE 2x10    FWD heel taps  4\" LLE 2x10    FWD heel taps    Step ups  8\" 3x10 8\" 3x10 8\" 3x10 8\" 3x10 8\" 3x10 8\" 2x15  8\" 2x15 8\" 2x15 8\" 2x10   Modified lunges to bolster  3x10 LLE 3x10 LLE 3x10 LLE 3x10 LLE 3x10 LLE 3x10 LLE 2x10 BLE   2x10 BLE 2x10 BLE 3x10 LLE   TRX squats  NV            Heel elevated on foam squats (quad focus)     3x10 YKB 3x10 YKB 3x10 YKB 3x10 YKB 3x10 YKB 3x10 RKB  3x10 RKB    Stairs in clinic              Gait Training             Hurdles                           Modalities                                                      "

## 2025-05-16 ENCOUNTER — OFFICE VISIT (OUTPATIENT)
Dept: PHYSICAL THERAPY | Facility: CLINIC | Age: 70
End: 2025-05-16
Payer: MEDICARE

## 2025-05-16 DIAGNOSIS — G89.29 CHRONIC PAIN OF LEFT KNEE: Primary | ICD-10-CM

## 2025-05-16 DIAGNOSIS — M25.562 CHRONIC PAIN OF LEFT KNEE: Primary | ICD-10-CM

## 2025-05-16 DIAGNOSIS — Z96.652 TOTAL KNEE REPLACEMENT STATUS, LEFT: ICD-10-CM

## 2025-05-16 PROCEDURE — 97110 THERAPEUTIC EXERCISES: CPT

## 2025-05-16 PROCEDURE — 97112 NEUROMUSCULAR REEDUCATION: CPT

## 2025-05-16 PROCEDURE — 97530 THERAPEUTIC ACTIVITIES: CPT
